# Patient Record
Sex: FEMALE | Race: WHITE | Employment: FULL TIME | URBAN - NONMETROPOLITAN AREA
[De-identification: names, ages, dates, MRNs, and addresses within clinical notes are randomized per-mention and may not be internally consistent; named-entity substitution may affect disease eponyms.]

---

## 2017-01-16 ENCOUNTER — OFFICE VISIT (OUTPATIENT)
Dept: FAMILY MEDICINE CLINIC | Age: 32
End: 2017-01-16

## 2017-01-16 VITALS
WEIGHT: 114 LBS | BODY MASS INDEX: 20.98 KG/M2 | HEIGHT: 62 IN | TEMPERATURE: 97.2 F | OXYGEN SATURATION: 100 % | SYSTOLIC BLOOD PRESSURE: 112 MMHG | DIASTOLIC BLOOD PRESSURE: 65 MMHG | HEART RATE: 62 BPM | RESPIRATION RATE: 12 BRPM

## 2017-01-16 DIAGNOSIS — Z00.00 ROUTINE HEALTH MAINTENANCE: Primary | ICD-10-CM

## 2017-01-16 PROCEDURE — 99395 PREV VISIT EST AGE 18-39: CPT | Performed by: NURSE PRACTITIONER

## 2017-01-16 ASSESSMENT — ENCOUNTER SYMPTOMS
DIARRHEA: 0
VOMITING: 0
RESPIRATORY NEGATIVE: 1
EYES NEGATIVE: 1
ALLERGIC/IMMUNOLOGIC NEGATIVE: 1
SINUS PRESSURE: 0
NAUSEA: 0
GASTROINTESTINAL NEGATIVE: 1
TROUBLE SWALLOWING: 0
ABDOMINAL PAIN: 0
CHEST TIGHTNESS: 0
COUGH: 0
SHORTNESS OF BREATH: 0
CONSTIPATION: 0

## 2017-01-16 ASSESSMENT — PATIENT HEALTH QUESTIONNAIRE - PHQ9
SUM OF ALL RESPONSES TO PHQ QUESTIONS 1-9: 0
SUM OF ALL RESPONSES TO PHQ9 QUESTIONS 1 & 2: 0
2. FEELING DOWN, DEPRESSED OR HOPELESS: 0
1. LITTLE INTEREST OR PLEASURE IN DOING THINGS: 0

## 2017-02-20 ENCOUNTER — TELEPHONE (OUTPATIENT)
Dept: FAMILY MEDICINE CLINIC | Age: 32
End: 2017-02-20

## 2017-03-14 ENCOUNTER — TELEPHONE (OUTPATIENT)
Dept: FAMILY MEDICINE CLINIC | Age: 32
End: 2017-03-14

## 2017-03-24 ENCOUNTER — OFFICE VISIT (OUTPATIENT)
Dept: PRIMARY CARE CLINIC | Age: 32
End: 2017-03-24
Payer: COMMERCIAL

## 2017-03-24 VITALS
BODY MASS INDEX: 21.18 KG/M2 | RESPIRATION RATE: 16 BRPM | DIASTOLIC BLOOD PRESSURE: 76 MMHG | OXYGEN SATURATION: 100 % | HEART RATE: 74 BPM | HEIGHT: 61 IN | TEMPERATURE: 99.1 F | WEIGHT: 112.2 LBS | SYSTOLIC BLOOD PRESSURE: 110 MMHG

## 2017-03-24 DIAGNOSIS — J01.00 ACUTE NON-RECURRENT MAXILLARY SINUSITIS: Primary | ICD-10-CM

## 2017-03-24 PROCEDURE — 99213 OFFICE O/P EST LOW 20 MIN: CPT | Performed by: NURSE PRACTITIONER

## 2017-03-24 RX ORDER — FLUCONAZOLE 150 MG/1
TABLET ORAL
Qty: 2 TABLET | Refills: 0 | Status: SHIPPED | OUTPATIENT
Start: 2017-03-24 | End: 2017-09-17 | Stop reason: ALTCHOICE

## 2017-03-24 RX ORDER — AZITHROMYCIN 250 MG/1
TABLET, FILM COATED ORAL
Qty: 1 PACKET | Refills: 0 | Status: SHIPPED | OUTPATIENT
Start: 2017-03-24 | End: 2017-03-29

## 2017-03-24 RX ORDER — PREDNISONE 20 MG/1
20 TABLET ORAL 2 TIMES DAILY
Qty: 10 TABLET | Refills: 0 | Status: SHIPPED | OUTPATIENT
Start: 2017-03-24 | End: 2017-03-29

## 2017-03-24 ASSESSMENT — ENCOUNTER SYMPTOMS
COUGH: 1
SHORTNESS OF BREATH: 0
SINUS PRESSURE: 1
RHINORRHEA: 1
WHEEZING: 0
SORE THROAT: 1
SINUS COMPLAINT: 1

## 2017-04-27 ENCOUNTER — TELEPHONE (OUTPATIENT)
Dept: FAMILY MEDICINE CLINIC | Age: 32
End: 2017-04-27

## 2017-06-12 ENCOUNTER — OFFICE VISIT (OUTPATIENT)
Dept: PRIMARY CARE CLINIC | Age: 32
End: 2017-06-12
Payer: COMMERCIAL

## 2017-06-12 VITALS
RESPIRATION RATE: 16 BRPM | HEART RATE: 72 BPM | DIASTOLIC BLOOD PRESSURE: 70 MMHG | SYSTOLIC BLOOD PRESSURE: 100 MMHG | TEMPERATURE: 98.2 F | WEIGHT: 115 LBS | HEIGHT: 62 IN | OXYGEN SATURATION: 100 % | BODY MASS INDEX: 21.16 KG/M2

## 2017-06-12 DIAGNOSIS — L23.7 ALLERGIC DERMATITIS DUE TO POISON IVY: Primary | ICD-10-CM

## 2017-06-12 PROCEDURE — 99213 OFFICE O/P EST LOW 20 MIN: CPT | Performed by: NURSE PRACTITIONER

## 2017-06-12 RX ORDER — TRIAMCINOLONE ACETONIDE 1 MG/G
CREAM TOPICAL 3 TIMES DAILY
Qty: 60 G | Refills: 1 | Status: SHIPPED | OUTPATIENT
Start: 2017-06-12 | End: 2017-06-22

## 2017-06-12 RX ORDER — PREDNISONE 10 MG/1
TABLET ORAL
Qty: 37 TABLET | Refills: 0 | Status: SHIPPED | OUTPATIENT
Start: 2017-06-12 | End: 2017-09-17 | Stop reason: ALTCHOICE

## 2017-06-12 ASSESSMENT — ENCOUNTER SYMPTOMS: RESPIRATORY NEGATIVE: 1

## 2017-09-17 ENCOUNTER — OFFICE VISIT (OUTPATIENT)
Dept: PRIMARY CARE CLINIC | Age: 32
End: 2017-09-17
Payer: COMMERCIAL

## 2017-09-17 VITALS
SYSTOLIC BLOOD PRESSURE: 110 MMHG | TEMPERATURE: 98.4 F | DIASTOLIC BLOOD PRESSURE: 64 MMHG | BODY MASS INDEX: 21.9 KG/M2 | WEIGHT: 116 LBS | HEIGHT: 61 IN | HEART RATE: 72 BPM | OXYGEN SATURATION: 100 %

## 2017-09-17 DIAGNOSIS — H66.001 ACUTE SUPPURATIVE OTITIS MEDIA OF RIGHT EAR WITHOUT SPONTANEOUS RUPTURE OF TYMPANIC MEMBRANE, RECURRENCE NOT SPECIFIED: Primary | ICD-10-CM

## 2017-09-17 PROBLEM — J45.990 EXERCISE-INDUCED ASTHMA: Status: ACTIVE | Noted: 2017-09-17

## 2017-09-17 PROCEDURE — 99213 OFFICE O/P EST LOW 20 MIN: CPT | Performed by: FAMILY MEDICINE

## 2017-09-17 RX ORDER — AZITHROMYCIN 250 MG/1
TABLET, FILM COATED ORAL
Qty: 1 PACKET | Refills: 0 | Status: SHIPPED | OUTPATIENT
Start: 2017-09-17 | End: 2019-03-26 | Stop reason: SDUPTHER

## 2018-05-16 ENCOUNTER — OFFICE VISIT (OUTPATIENT)
Dept: FAMILY MEDICINE CLINIC | Age: 33
End: 2018-05-16
Payer: COMMERCIAL

## 2018-05-16 VITALS
RESPIRATION RATE: 12 BRPM | OXYGEN SATURATION: 99 % | TEMPERATURE: 98.7 F | HEART RATE: 65 BPM | SYSTOLIC BLOOD PRESSURE: 100 MMHG | BODY MASS INDEX: 21.3 KG/M2 | HEIGHT: 61 IN | WEIGHT: 112.8 LBS | DIASTOLIC BLOOD PRESSURE: 60 MMHG

## 2018-05-16 DIAGNOSIS — Z00.00 ROUTINE HEALTH MAINTENANCE: Primary | ICD-10-CM

## 2018-05-16 DIAGNOSIS — Z91.09 ENVIRONMENTAL ALLERGIES: ICD-10-CM

## 2018-05-16 DIAGNOSIS — Z23 NEED FOR TDAP VACCINATION: ICD-10-CM

## 2018-05-16 PROCEDURE — 99395 PREV VISIT EST AGE 18-39: CPT | Performed by: NURSE PRACTITIONER

## 2018-05-16 RX ORDER — LORATADINE 10 MG/1
10 TABLET ORAL DAILY
COMMUNITY

## 2018-05-16 RX ORDER — MOMETASONE FUROATE 50 UG/1
2 SPRAY, METERED NASAL 2 TIMES DAILY PRN
COMMUNITY

## 2018-05-16 ASSESSMENT — ENCOUNTER SYMPTOMS
SINUS PRESSURE: 0
CONSTIPATION: 0
NAUSEA: 0
COUGH: 0
SHORTNESS OF BREATH: 0
EYES NEGATIVE: 1
DIARRHEA: 0
ABDOMINAL PAIN: 0
CHEST TIGHTNESS: 0
VOMITING: 0
ALLERGIC/IMMUNOLOGIC NEGATIVE: 1
TROUBLE SWALLOWING: 0

## 2018-05-16 ASSESSMENT — PATIENT HEALTH QUESTIONNAIRE - PHQ9
1. LITTLE INTEREST OR PLEASURE IN DOING THINGS: 0
SUM OF ALL RESPONSES TO PHQ9 QUESTIONS 1 & 2: 0
SUM OF ALL RESPONSES TO PHQ QUESTIONS 1-9: 0
2. FEELING DOWN, DEPRESSED OR HOPELESS: 0

## 2018-05-18 ENCOUNTER — NURSE ONLY (OUTPATIENT)
Dept: LAB | Age: 33
End: 2018-05-18
Payer: COMMERCIAL

## 2018-05-18 DIAGNOSIS — Z00.00 ROUTINE HEALTH MAINTENANCE: ICD-10-CM

## 2018-05-18 DIAGNOSIS — Z23 NEED FOR TDAP VACCINATION: ICD-10-CM

## 2018-05-18 PROCEDURE — 90715 TDAP VACCINE 7 YRS/> IM: CPT | Performed by: NURSE PRACTITIONER

## 2018-05-18 PROCEDURE — 90471 IMMUNIZATION ADMIN: CPT | Performed by: NURSE PRACTITIONER

## 2018-05-31 ENCOUNTER — HOSPITAL ENCOUNTER (OUTPATIENT)
Dept: LAB | Age: 33
Setting detail: SPECIMEN
Discharge: HOME | End: 2018-05-31
Payer: COMMERCIAL

## 2018-05-31 DIAGNOSIS — Z00.00 ROUTINE HEALTH MAINTENANCE: ICD-10-CM

## 2018-05-31 LAB
ABSOLUTE EOS #: 0 K/UL (ref 0–0.4)
ABSOLUTE IMMATURE GRANULOCYTE: ABNORMAL K/UL (ref 0–0.3)
ABSOLUTE LYMPH #: 1 K/UL (ref 1–4.8)
ABSOLUTE MONO #: 0.4 K/UL (ref 0.1–1.2)
ALBUMIN SERPL-MCNC: 4.5 G/DL (ref 3.5–5.2)
ALBUMIN/GLOBULIN RATIO: 1.4 (ref 1–2.5)
ALP BLD-CCNC: 57 U/L (ref 35–104)
ALT SERPL-CCNC: 11 U/L (ref 5–33)
ANION GAP SERPL CALCULATED.3IONS-SCNC: 14 MMOL/L (ref 9–17)
AST SERPL-CCNC: 16 U/L
BASOPHILS # BLD: 0 % (ref 0–1)
BASOPHILS ABSOLUTE: 0 K/UL (ref 0–0.2)
BILIRUB SERPL-MCNC: 0.39 MG/DL (ref 0.3–1.2)
BUN BLDV-MCNC: 10 MG/DL (ref 6–20)
BUN/CREAT BLD: 20 (ref 9–20)
CALCIUM SERPL-MCNC: 9.5 MG/DL (ref 8.6–10.4)
CHLORIDE BLD-SCNC: 100 MMOL/L (ref 98–107)
CHOLESTEROL/HDL RATIO: 2.2
CHOLESTEROL: 170 MG/DL
CO2: 25 MMOL/L (ref 20–31)
CREAT SERPL-MCNC: 0.51 MG/DL (ref 0.5–0.9)
DIFFERENTIAL TYPE: ABNORMAL
EOSINOPHILS RELATIVE PERCENT: 0 % (ref 1–7)
ESTIMATED AVERAGE GLUCOSE: 105 MG/DL
GFR AFRICAN AMERICAN: >60 ML/MIN
GFR NON-AFRICAN AMERICAN: >60 ML/MIN
GFR SERPL CREATININE-BSD FRML MDRD: ABNORMAL ML/MIN/{1.73_M2}
GFR SERPL CREATININE-BSD FRML MDRD: ABNORMAL ML/MIN/{1.73_M2}
GLUCOSE BLD-MCNC: 113 MG/DL (ref 70–99)
HBA1C MFR BLD: 5.3 % (ref 4.8–5.9)
HCT VFR BLD CALC: 41.1 % (ref 36–46)
HDLC SERPL-MCNC: 78 MG/DL
HEMOGLOBIN: 13.5 G/DL (ref 12–16)
HIV AG/AB: NONREACTIVE
IMMATURE GRANULOCYTES: ABNORMAL %
LDL CHOLESTEROL: 83 MG/DL (ref 0–130)
LYMPHOCYTES # BLD: 17 % (ref 16–46)
MCH RBC QN AUTO: 29.9 PG (ref 26–34)
MCHC RBC AUTO-ENTMCNC: 32.8 G/DL (ref 31–37)
MCV RBC AUTO: 91.1 FL (ref 80–100)
MONOCYTES # BLD: 6 % (ref 4–11)
NRBC AUTOMATED: ABNORMAL PER 100 WBC
PDW BLD-RTO: 13 % (ref 11–14.5)
PLATELET # BLD: 237 K/UL (ref 140–450)
PLATELET ESTIMATE: ABNORMAL
PMV BLD AUTO: 9.3 FL (ref 6–12)
POTASSIUM SERPL-SCNC: 4.3 MMOL/L (ref 3.7–5.3)
RBC # BLD: 4.52 M/UL (ref 4–5.2)
RBC # BLD: ABNORMAL 10*6/UL
SEG NEUTROPHILS: 77 % (ref 43–77)
SEGMENTED NEUTROPHILS ABSOLUTE COUNT: 4.5 K/UL (ref 1.8–7.7)
SODIUM BLD-SCNC: 139 MMOL/L (ref 135–144)
THYROXINE, FREE: 1.18 NG/DL (ref 0.93–1.7)
TOTAL PROTEIN: 7.8 G/DL (ref 6.4–8.3)
TRIGL SERPL-MCNC: 46 MG/DL
TSH SERPL DL<=0.05 MIU/L-ACNC: 0.67 MIU/L (ref 0.3–5)
VITAMIN D 25-HYDROXY: 24.1 NG/ML (ref 30–100)
VLDLC SERPL CALC-MCNC: NORMAL MG/DL (ref 1–30)
WBC # BLD: 5.9 K/UL (ref 3.5–11)
WBC # BLD: ABNORMAL 10*3/UL

## 2018-05-31 PROCEDURE — 84443 ASSAY THYROID STIM HORMONE: CPT

## 2018-05-31 PROCEDURE — 84439 ASSAY OF FREE THYROXINE: CPT

## 2018-05-31 PROCEDURE — 82306 VITAMIN D 25 HYDROXY: CPT

## 2018-05-31 PROCEDURE — 36415 COLL VENOUS BLD VENIPUNCTURE: CPT

## 2018-05-31 PROCEDURE — 83036 HEMOGLOBIN GLYCOSYLATED A1C: CPT

## 2018-05-31 PROCEDURE — 80061 LIPID PANEL: CPT

## 2018-05-31 PROCEDURE — 87389 HIV-1 AG W/HIV-1&-2 AB AG IA: CPT

## 2018-05-31 PROCEDURE — 85025 COMPLETE CBC W/AUTO DIFF WBC: CPT

## 2018-05-31 PROCEDURE — 80053 COMPREHEN METABOLIC PANEL: CPT

## 2018-08-06 ENCOUNTER — NURSE ONLY (OUTPATIENT)
Dept: LAB | Age: 33
End: 2018-08-06
Payer: COMMERCIAL

## 2018-08-06 DIAGNOSIS — Z23 NEED FOR VACCINATION: Primary | ICD-10-CM

## 2018-08-06 PROCEDURE — 90632 HEPA VACCINE ADULT IM: CPT | Performed by: NURSE PRACTITIONER

## 2018-08-06 PROCEDURE — 90471 IMMUNIZATION ADMIN: CPT | Performed by: NURSE PRACTITIONER

## 2019-03-08 ENCOUNTER — OFFICE VISIT (OUTPATIENT)
Dept: PRIMARY CARE CLINIC | Age: 34
End: 2019-03-08
Payer: COMMERCIAL

## 2019-03-08 VITALS
TEMPERATURE: 99.3 F | HEIGHT: 61 IN | WEIGHT: 116.8 LBS | DIASTOLIC BLOOD PRESSURE: 64 MMHG | OXYGEN SATURATION: 99 % | HEART RATE: 92 BPM | BODY MASS INDEX: 22.05 KG/M2 | RESPIRATION RATE: 12 BRPM | SYSTOLIC BLOOD PRESSURE: 100 MMHG

## 2019-03-08 DIAGNOSIS — R50.9 FEVER, UNSPECIFIED FEVER CAUSE: ICD-10-CM

## 2019-03-08 DIAGNOSIS — J02.9 SORE THROAT: ICD-10-CM

## 2019-03-08 DIAGNOSIS — B97.89 VIRAL SINUSITIS: Primary | ICD-10-CM

## 2019-03-08 DIAGNOSIS — J32.9 VIRAL SINUSITIS: Primary | ICD-10-CM

## 2019-03-08 LAB
INFLUENZA A ANTIBODY: NORMAL
INFLUENZA B ANTIBODY: NORMAL
S PYO AG THROAT QL: NORMAL

## 2019-03-08 PROCEDURE — 99213 OFFICE O/P EST LOW 20 MIN: CPT | Performed by: NURSE PRACTITIONER

## 2019-03-08 PROCEDURE — 87880 STREP A ASSAY W/OPTIC: CPT | Performed by: NURSE PRACTITIONER

## 2019-03-08 PROCEDURE — 87804 INFLUENZA ASSAY W/OPTIC: CPT | Performed by: NURSE PRACTITIONER

## 2019-03-08 RX ORDER — PREDNISONE 20 MG/1
20 TABLET ORAL 2 TIMES DAILY
Qty: 10 TABLET | Refills: 0 | Status: SHIPPED | OUTPATIENT
Start: 2019-03-08 | End: 2019-03-13

## 2019-03-08 SDOH — HEALTH STABILITY: MENTAL HEALTH: HOW OFTEN DO YOU HAVE A DRINK CONTAINING ALCOHOL?: MONTHLY OR LESS

## 2019-03-08 SDOH — HEALTH STABILITY: MENTAL HEALTH: HOW MANY STANDARD DRINKS CONTAINING ALCOHOL DO YOU HAVE ON A TYPICAL DAY?: 1 OR 2

## 2019-03-08 ASSESSMENT — PATIENT HEALTH QUESTIONNAIRE - PHQ9
2. FEELING DOWN, DEPRESSED OR HOPELESS: 0
SUM OF ALL RESPONSES TO PHQ9 QUESTIONS 1 & 2: 0
SUM OF ALL RESPONSES TO PHQ QUESTIONS 1-9: 0
1. LITTLE INTEREST OR PLEASURE IN DOING THINGS: 0
SUM OF ALL RESPONSES TO PHQ QUESTIONS 1-9: 0

## 2019-03-08 ASSESSMENT — ENCOUNTER SYMPTOMS
SINUS PRESSURE: 1
COUGH: 0
GASTROINTESTINAL NEGATIVE: 1
RESPIRATORY NEGATIVE: 1
SORE THROAT: 1

## 2019-03-19 ENCOUNTER — TELEPHONE (OUTPATIENT)
Dept: FAMILY MEDICINE CLINIC | Age: 34
End: 2019-03-19

## 2019-03-19 ENCOUNTER — E-VISIT (OUTPATIENT)
Dept: PRIMARY CARE CLINIC | Age: 34
End: 2019-03-19
Payer: COMMERCIAL

## 2019-03-19 DIAGNOSIS — J01.90 ACUTE BACTERIAL SINUSITIS: Primary | ICD-10-CM

## 2019-03-19 DIAGNOSIS — B96.89 ACUTE BACTERIAL SINUSITIS: Primary | ICD-10-CM

## 2019-03-19 PROCEDURE — 98968 PH1 ASSMT&MGMT NQHP 21-30: CPT | Performed by: NURSE PRACTITIONER

## 2019-03-19 RX ORDER — AMOXICILLIN AND CLAVULANATE POTASSIUM 875; 125 MG/1; MG/1
1 TABLET, FILM COATED ORAL 2 TIMES DAILY
Qty: 20 TABLET | Refills: 0 | Status: SHIPPED | OUTPATIENT
Start: 2019-03-19 | End: 2019-03-26

## 2019-03-19 ASSESSMENT — LIFESTYLE VARIABLES: SMOKING_STATUS: NO, I'VE NEVER SMOKED

## 2019-03-20 ENCOUNTER — TELEPHONE (OUTPATIENT)
Dept: FAMILY MEDICINE CLINIC | Age: 34
End: 2019-03-20

## 2019-03-20 DIAGNOSIS — T36.95XA ANTIBIOTIC-INDUCED YEAST INFECTION: Primary | ICD-10-CM

## 2019-03-20 DIAGNOSIS — B37.9 ANTIBIOTIC-INDUCED YEAST INFECTION: Primary | ICD-10-CM

## 2019-03-21 RX ORDER — FLUCONAZOLE 150 MG/1
TABLET ORAL
Qty: 2 TABLET | Refills: 0 | Status: SHIPPED | OUTPATIENT
Start: 2019-03-21 | End: 2019-04-26 | Stop reason: ALTCHOICE

## 2019-03-21 RX ORDER — FLUCONAZOLE 150 MG/1
150 TABLET ORAL
Qty: 2 TABLET | Refills: 0 | Status: CANCELLED | OUTPATIENT
Start: 2019-03-21 | End: 2019-03-25

## 2019-03-26 ENCOUNTER — TELEPHONE (OUTPATIENT)
Dept: FAMILY MEDICINE CLINIC | Age: 34
End: 2019-03-26

## 2019-03-26 DIAGNOSIS — B96.89 ACUTE BACTERIAL SINUSITIS: Primary | ICD-10-CM

## 2019-03-26 DIAGNOSIS — J01.90 ACUTE BACTERIAL SINUSITIS: Primary | ICD-10-CM

## 2019-03-26 RX ORDER — AZITHROMYCIN 250 MG/1
TABLET, FILM COATED ORAL
Qty: 1 PACKET | Refills: 0 | Status: SHIPPED | OUTPATIENT
Start: 2019-03-26 | End: 2019-03-30

## 2019-03-27 DIAGNOSIS — Z20.828 EXPOSURE TO INFLUENZA: Primary | ICD-10-CM

## 2019-03-27 RX ORDER — OSELTAMIVIR PHOSPHATE 75 MG/1
75 CAPSULE ORAL 2 TIMES DAILY
Qty: 10 CAPSULE | Refills: 0 | Status: SHIPPED | OUTPATIENT
Start: 2019-03-27 | End: 2019-04-01

## 2019-04-26 ENCOUNTER — OFFICE VISIT (OUTPATIENT)
Dept: FAMILY MEDICINE CLINIC | Age: 34
End: 2019-04-26
Payer: COMMERCIAL

## 2019-04-26 VITALS
TEMPERATURE: 98.3 F | SYSTOLIC BLOOD PRESSURE: 98 MMHG | HEIGHT: 61 IN | BODY MASS INDEX: 22.24 KG/M2 | HEART RATE: 72 BPM | DIASTOLIC BLOOD PRESSURE: 64 MMHG | RESPIRATION RATE: 12 BRPM | WEIGHT: 117.8 LBS | OXYGEN SATURATION: 99 %

## 2019-04-26 DIAGNOSIS — B35.1 TOENAIL FUNGUS: Primary | ICD-10-CM

## 2019-04-26 PROCEDURE — 99213 OFFICE O/P EST LOW 20 MIN: CPT | Performed by: NURSE PRACTITIONER

## 2019-04-26 ASSESSMENT — PATIENT HEALTH QUESTIONNAIRE - PHQ9
SUM OF ALL RESPONSES TO PHQ QUESTIONS 1-9: 0
1. LITTLE INTEREST OR PLEASURE IN DOING THINGS: 0
SUM OF ALL RESPONSES TO PHQ QUESTIONS 1-9: 0
2. FEELING DOWN, DEPRESSED OR HOPELESS: 0
SUM OF ALL RESPONSES TO PHQ9 QUESTIONS 1 & 2: 0

## 2019-04-26 NOTE — PATIENT INSTRUCTIONS
Patient Education        Toenail Fungus: Care Instructions  Your Care Instructions  A toenail that is infected by a fungus usually turns white or yellow. As the fungus spreads, the nail turns a darker color and gets thicker, and its edges start to turn ragged and crumble. A bad infection can cause toe pain, and the nail may pull away from the toe. Toenails that are exposed to moisture and warmth a lot are more likely to get infected by a fungus. This can happen from wearing sweaty shoes often and from walking barefoot on shower floors. It is hard to treat toenail fungus, and the infection can return after it has cleared up. But medicines can sometimes get rid of toenail fungus for good. If the infection is very bad, or if it causes a lot of pain, you may need to have the nail removed. Follow-up care is a key part of your treatment and safety. Be sure to make and go to all appointments, and call your doctor if you are having problems. It's also a good idea to know your test results and keep a list of the medicines you take. How can you care for yourself at home? · Take your medicines exactly as prescribed. Call your doctor if you have any problems with your medicine. You will get more details on the specific medicines your doctor prescribes. · If your doctor gave you a cream or liquid to put on your toenail, use it exactly as directed. · Wash your feet often, and wash your hands after touching your feet. · Keep your toenails clean and dry. Dry your feet completely after you bathe and before you put on shoes and socks. · Keep your toenails trimmed. · Change socks often. Wear dry socks that absorb moisture. · Do not go barefoot in public places. · Use a spray or powder that fights fungus on your feet and in your shoes. · Do not pick at the skin around your nails. · Do not use nail polish or fake nails on your toenails. When should you call for help?   Call your doctor now or seek immediate medical care if:    · You have signs of infection, such as:  ? Increased pain, swelling, warmth, or redness. ? Red streaks leading from the site. ? Pus draining from the site. ? A fever.     · You have new or increased toe pain.    Watch closely for changes in your health, and be sure to contact your doctor if:    · You do not get better as expected. Where can you learn more? Go to https://OpenDoors.supepiceweb.Virtual Gaming Worlds. org and sign in to your Mesosphere account. Enter D202 in the Lumiata box to learn more about \"Toenail Fungus: Care Instructions. \"     If you do not have an account, please click on the \"Sign Up Now\" link. Current as of: April 17, 2018  Content Version: 11.9  © 7244-3969 [employer]URL[employer], [employer]orporated. Care instructions adapted under license by [facility] ([facility]). If you have questions about a medical condition or this instruction, always ask your healthcare professional. [place] [address] any warranty or liability for your use of this information.

## 2019-04-26 NOTE — PROGRESS NOTES
[facility]    Subjective:     Patient ID: Jordi Sams is a 29 y.o. y.o. female. HPI Patient in for office for bilateral great toe pain. This is a new problem for her. She states she has been dealing with this on and off for the past 5 years. She states that she was wearing some bad shoes years a go and the nail cracked. She has seen podiatrist in the past. She has discolored thick nails now. She states that her toe nail feel ingrown. Past Medical History:   Diagnosis Date    Asthma     Exercise-induced asthma        Past Surgical History:   Procedure Laterality Date     SECTION      MOUTH SURGERY      cyst removal at 7yo    TYMPANOSTOMY TUBE PLACEMENT Right 2001       Family History   Problem Relation Age of Onset    High Blood Pressure Mother     High Blood Pressure Father     Asthma Brother           Allergies   Allergen Reactions    Sulfa Antibiotics Swelling       Current Outpatient Medications   Medication Sig Dispense Refill    IBUPROFEN PO Take 2 tablets by mouth every 6 hours as needed      mometasone (NASONEX) 50 MCG/ACT nasal spray 2 sprays by Nasal route 2 times daily as needed (allergies)      loratadine (CLARITIN) 10 MG tablet Take 10 mg by mouth daily       No current facility-administered medications for this visit. Review of Systems   Constitutional: Negative for activity change and appetite change. Musculoskeletal: Positive for myalgias (bilateral great toe pain). Objective:       BP 98/64 (Site: Right Upper Arm, Position: Sitting, Cuff Size: Medium Adult)   Pulse 72   Temp 98.3 °F (36.8 °C) (Tympanic)   Resp 12   Ht 5' 0.98\" (1.549 m)   Wt 117 lb 12.8 oz (53.4 kg)   LMP 2019 (Within Days)   SpO2 99%   Breastfeeding? No   BMI 22.27 kg/m²     Physical Exam   Constitutional: She is oriented to person, place, and time. Vital signs are normal. She appears well-developed and well-nourished.  She is active and cooperative. HENT:   Head: Normocephalic and atraumatic. Nose: Nose normal.   Eyes: Pupils are equal, round, and reactive to light. EOM are normal.   Neck: Normal range of motion. Cardiovascular: Normal rate and regular rhythm. Pulmonary/Chest: Effort normal and breath sounds normal.   Musculoskeletal: Normal range of motion. Feet:    Left toe-nail bed is thick and very flaky and short and thick. Right toe nail thick and discolored. Neurological: She is alert and oriented to person, place, and time. Skin: Skin is warm and dry. Psychiatric: She has a normal mood and affect. Her behavior is normal.   Nursing note and vitals reviewed. Assessment & Plan:      1. Toenail fungus-new problem  Talked about supportive care at this time. Discussed lamisil and she declines. Answered all of the patient's questions. Agrees with plan of care. Follow up PRN.            SANTO Vargas CNP   4/26/2019 10:34 AM

## 2019-11-08 ENCOUNTER — IMMUNIZATION (OUTPATIENT)
Dept: LAB | Age: 34
End: 2019-11-08
Payer: COMMERCIAL

## 2019-11-08 DIAGNOSIS — Z23 NEED FOR VACCINATION: Primary | ICD-10-CM

## 2019-11-08 PROCEDURE — 90472 IMMUNIZATION ADMIN EACH ADD: CPT | Performed by: NURSE PRACTITIONER

## 2019-11-08 PROCEDURE — 90471 IMMUNIZATION ADMIN: CPT | Performed by: NURSE PRACTITIONER

## 2019-11-08 PROCEDURE — 90632 HEPA VACCINE ADULT IM: CPT | Performed by: NURSE PRACTITIONER

## 2019-11-08 PROCEDURE — 90686 IIV4 VACC NO PRSV 0.5 ML IM: CPT | Performed by: NURSE PRACTITIONER

## 2019-12-13 ENCOUNTER — OFFICE VISIT (OUTPATIENT)
Dept: PRIMARY CARE CLINIC | Age: 34
End: 2019-12-13
Payer: COMMERCIAL

## 2019-12-13 VITALS
HEART RATE: 84 BPM | WEIGHT: 121.3 LBS | SYSTOLIC BLOOD PRESSURE: 112 MMHG | OXYGEN SATURATION: 98 % | TEMPERATURE: 98 F | DIASTOLIC BLOOD PRESSURE: 74 MMHG | BODY MASS INDEX: 22.93 KG/M2

## 2019-12-13 DIAGNOSIS — B96.89 ACUTE BACTERIAL SINUSITIS: Primary | ICD-10-CM

## 2019-12-13 DIAGNOSIS — H66.001 NON-RECURRENT ACUTE SUPPURATIVE OTITIS MEDIA OF RIGHT EAR WITHOUT SPONTANEOUS RUPTURE OF TYMPANIC MEMBRANE: ICD-10-CM

## 2019-12-13 DIAGNOSIS — J01.90 ACUTE BACTERIAL SINUSITIS: Primary | ICD-10-CM

## 2019-12-13 PROCEDURE — 99214 OFFICE O/P EST MOD 30 MIN: CPT | Performed by: FAMILY MEDICINE

## 2019-12-13 RX ORDER — CETIRIZINE HYDROCHLORIDE 10 MG/1
10 TABLET ORAL DAILY
COMMUNITY
End: 2021-10-21

## 2019-12-13 RX ORDER — FLUCONAZOLE 150 MG/1
150 TABLET ORAL ONCE
Qty: 2 TABLET | Refills: 0 | Status: SHIPPED | OUTPATIENT
Start: 2019-12-13 | End: 2019-12-13

## 2019-12-13 RX ORDER — AMOXICILLIN 500 MG/1
500 CAPSULE ORAL 3 TIMES DAILY
Qty: 30 CAPSULE | Refills: 0 | Status: SHIPPED | OUTPATIENT
Start: 2019-12-13 | End: 2019-12-13

## 2019-12-13 RX ORDER — AMOXICILLIN AND CLAVULANATE POTASSIUM 875; 125 MG/1; MG/1
1 TABLET, FILM COATED ORAL 2 TIMES DAILY
Qty: 20 TABLET | Refills: 0 | Status: SHIPPED | OUTPATIENT
Start: 2019-12-13 | End: 2019-12-23

## 2019-12-13 ASSESSMENT — ENCOUNTER SYMPTOMS
WHEEZING: 0
RHINORRHEA: 1
CHOKING: 0
COUGH: 1
SHORTNESS OF BREATH: 0
CHEST TIGHTNESS: 0
DIARRHEA: 0
NAUSEA: 0
SINUS PRESSURE: 1
SORE THROAT: 1
CONSTIPATION: 0
TROUBLE SWALLOWING: 0

## 2020-06-12 ENCOUNTER — VIRTUAL VISIT (OUTPATIENT)
Dept: FAMILY MEDICINE CLINIC | Age: 35
End: 2020-06-12
Payer: COMMERCIAL

## 2020-06-12 PROCEDURE — 99395 PREV VISIT EST AGE 18-39: CPT | Performed by: NURSE PRACTITIONER

## 2020-06-12 ASSESSMENT — ENCOUNTER SYMPTOMS
RESPIRATORY NEGATIVE: 1
GASTROINTESTINAL NEGATIVE: 1

## 2020-06-12 ASSESSMENT — PATIENT HEALTH QUESTIONNAIRE - PHQ9
SUM OF ALL RESPONSES TO PHQ9 QUESTIONS 1 & 2: 0
2. FEELING DOWN, DEPRESSED OR HOPELESS: 0
SUM OF ALL RESPONSES TO PHQ QUESTIONS 1-9: 0
1. LITTLE INTEREST OR PLEASURE IN DOING THINGS: 0
SUM OF ALL RESPONSES TO PHQ QUESTIONS 1-9: 0

## 2020-06-12 NOTE — PROGRESS NOTES
and atraumatic. Nose: Nose normal.   Pulmonary:      Effort: Pulmonary effort is normal.      Comments: No distress noted  Neurological:      Mental Status: She is alert and oriented to person, place, and time. Psychiatric:         Behavior: Behavior normal.         Thought Content: Thought content normal.         Judgment: Judgment normal.           Assessment & Plan:      1. Routine health maintenance      2. Environmental allergies-chronic stable on medication      3. Exercise-induced asthma-chronic stable on medication    Answered all of the patient's questions. Agrees with plan of care. Follow up in one year or sooner if needed      Trish Salazar is a 28 y.o. female being evaluated by a Virtual Visit (video visit) encounter to address concerns as mentioned above. A caregiver was present when appropriate. Due to this being a TeleHealth encounter (During [place] [address] public health emergency), evaluation of the following organ systems was limited: Vitals/Constitutional/EENT/Resp/CV/GI//MS/Neuro/Skin/Heme-Lymph-Imm. Pursuant to the emergency declaration under the [address] [place] [address], [address] [place] [address] authority and the [employer] and [employer] Act, this Virtual Visit was conducted with patient's (and/or legal guardian's) consent, to reduce the patient's risk of exposure to COVID-19 and provide necessary medical care. The patient (and/or legal guardian) has also been advised to contact this office for worsening conditions or problems, and seek emergency medical treatment and/or call 911 if deemed necessary. Patient identification was verified at the start of the visit: Yes    Total time spent for this encounter: Not billed by time    Services were provided through a video synchronous discussion virtually to substitute for in-person clinic visit. Patient and provider were located at their individual homes.     --SANTO Bella -

## 2021-03-08 ENCOUNTER — OFFICE VISIT (OUTPATIENT)
Dept: PRIMARY CARE CLINIC | Age: 36
End: 2021-03-08
Payer: COMMERCIAL

## 2021-03-08 ENCOUNTER — HOSPITAL ENCOUNTER (OUTPATIENT)
Age: 36
Setting detail: SPECIMEN
Discharge: HOME | End: 2021-03-08
Payer: COMMERCIAL

## 2021-03-08 VITALS
HEIGHT: 61 IN | WEIGHT: 121.8 LBS | BODY MASS INDEX: 23 KG/M2 | SYSTOLIC BLOOD PRESSURE: 110 MMHG | RESPIRATION RATE: 17 BRPM | TEMPERATURE: 98.9 F | DIASTOLIC BLOOD PRESSURE: 79 MMHG | HEART RATE: 78 BPM | OXYGEN SATURATION: 100 %

## 2021-03-08 DIAGNOSIS — Z20.818 EXPOSURE TO STREP THROAT: ICD-10-CM

## 2021-03-08 DIAGNOSIS — J02.9 PHARYNGITIS, UNSPECIFIED ETIOLOGY: Primary | ICD-10-CM

## 2021-03-08 DIAGNOSIS — J02.9 PHARYNGITIS, UNSPECIFIED ETIOLOGY: ICD-10-CM

## 2021-03-08 LAB — S PYO AG THROAT QL: NORMAL

## 2021-03-08 PROCEDURE — 87651 STREP A DNA AMP PROBE: CPT

## 2021-03-08 PROCEDURE — 99213 OFFICE O/P EST LOW 20 MIN: CPT | Performed by: NURSE PRACTITIONER

## 2021-03-08 PROCEDURE — 87880 STREP A ASSAY W/OPTIC: CPT | Performed by: NURSE PRACTITIONER

## 2021-03-08 ASSESSMENT — ENCOUNTER SYMPTOMS
GASTROINTESTINAL NEGATIVE: 1
SORE THROAT: 1
COUGH: 0
NAUSEA: 0
RHINORRHEA: 0
RESPIRATORY NEGATIVE: 1
VOMITING: 0
ABDOMINAL PAIN: 0
SINUS PRESSURE: 0
SWOLLEN GLANDS: 1

## 2021-03-08 ASSESSMENT — PATIENT HEALTH QUESTIONNAIRE - PHQ9
2. FEELING DOWN, DEPRESSED OR HOPELESS: 0
1. LITTLE INTEREST OR PLEASURE IN DOING THINGS: 0
SUM OF ALL RESPONSES TO PHQ QUESTIONS 1-9: 0

## 2021-03-08 NOTE — PATIENT INSTRUCTIONS
Patient Education        Sore Throat: Care Instructions  Your Care Instructions     Infection by bacteria or a virus causes most sore throats. Cigarette smoke, dry air, air pollution, allergies, and yelling can also cause a sore throat. Sore throats can be painful and annoying. Fortunately, most sore throats go away on their own. If you have a bacterial infection, your doctor may prescribe antibiotics. Follow-up care is a key part of your treatment and safety. Be sure to make and go to all appointments, and call your doctor if you are having problems. It's also a good idea to know your test results and keep a list of the medicines you take. How can you care for yourself at home? · If your doctor prescribed antibiotics, take them as directed. Do not stop taking them just because you feel better. You need to take the full course of antibiotics. · Gargle with warm salt water once an hour to help reduce swelling and relieve discomfort. Use 1 teaspoon of salt mixed in 1 cup of warm water. · Take an over-the-counter pain medicine, such as acetaminophen (Tylenol), ibuprofen (Advil, Motrin), or naproxen (Aleve). Read and follow all instructions on the label. · Be careful when taking over-the-counter cold or flu medicines and Tylenol at the same time. Many of these medicines have acetaminophen, which is Tylenol. Read the labels to make sure that you are not taking more than the recommended dose. Too much acetaminophen (Tylenol) can be harmful. · Drink plenty of fluids. Fluids may help soothe an irritated throat. Hot fluids, such as tea or soup, may help decrease throat pain. · Use over-the-counter throat lozenges to soothe pain. Regular cough drops or hard candy may also help. These should not be given to young children because of the risk of choking. · Do not smoke or allow others to smoke around you. If you need help quitting, talk to your doctor about stop-smoking programs and medicines.  These can increase your chances of quitting for good. · Use a vaporizer or humidifier to add moisture to your bedroom. Follow the directions for cleaning the machine. When should you call for help? Call your doctor now or seek immediate medical care if:    · You have new or worse trouble swallowing.     · Your sore throat gets much worse on one side. Watch closely for changes in your health, and be sure to contact your doctor if you do not get better as expected. Where can you learn more? Go to https://Taskhero.com.IDMission. org and sign in to your Lucky Ant account. Enter Q279 in the Sense of Skin box to learn more about \"Sore Throat: Care Instructions. \"     If you do not have an account, please click on the \"Sign Up Now\" link. Current as of: April 15, 2020               Content Version: 12.6  © 2360-3257 TapDog, Incorporated. Care instructions adapted under license by [facility] ([facility]). If you have questions about a medical condition or this instruction, always ask your healthcare professional. [place] [address] any warranty or liability for your use of this information.

## 2021-03-08 NOTE — PROGRESS NOTES
[facility]             [address], [address]                        Telephone [phone]             Fax [phone]     Silvia Krishnamurthy  1985  NXE:[id number]   Date of visit:  3/8/2021    Subjective:    Silvia Krishnamurthy is a 39 y.o.  female who presents to [facility] today (3/8/2021) for evaluation of:    Chief Complaint   Patient presents with    Pharyngitis     HA. Family member dx with strep. Sx started 2 days ago. Pharyngitis  This is a new problem. The current episode started yesterday. The problem occurs constantly. The problem has been gradually worsening. Associated symptoms include fatigue, headaches, a sore throat and swollen glands. Pertinent negatives include no abdominal pain, chest pain, chills, congestion, coughing, fever, myalgias, nausea, rash or vomiting. The symptoms are aggravated by swallowing. Treatments tried: ibuprofen. The treatment provided moderate relief. Family member in the home tested positive for strep throat this morning. Patient ate after this family member 2 days ago. She has the following problem list:  Patient Active Problem List   Diagnosis    Exercise-induced asthma    Environmental allergies        Current medications are:  Current Outpatient Medications   Medication Sig Dispense Refill    IBUPROFEN PO Take 2 tablets by mouth every 6 hours as needed      mometasone (NASONEX) 50 MCG/ACT nasal spray 2 sprays by Nasal route 2 times daily as needed (allergies)      loratadine (CLARITIN) 10 MG tablet Take 10 mg by mouth daily      cetirizine (ZYRTEC) 10 MG tablet Take 10 mg by mouth daily       No current facility-administered medications for this visit. She is allergic to sulfa antibiotics. .    She  reports that she has never smoked.  She has never used smokeless tobacco.      Objective:    Vitals:    03/08/21 1817   BP: 110/79 Site: Right Upper Arm   Position: Sitting   Cuff Size: Medium Adult   Pulse: 78   Resp: 17   Temp: 98.9 °F (37.2 °C)   TempSrc: Temporal   SpO2: 100%   Weight: 121 lb 12.8 oz (55.2 kg)   Height: 5' 1\" (1.549 m)     Body mass index is 23.01 kg/m². Review of Systems   Constitutional: Positive for appetite change and fatigue. Negative for chills and fever. HENT: Positive for sore throat. Negative for congestion, postnasal drip, rhinorrhea and sinus pressure. Respiratory: Negative. Negative for cough. Cardiovascular: Negative. Negative for chest pain. Gastrointestinal: Negative. Negative for abdominal pain, nausea and vomiting. Musculoskeletal: Negative for myalgias. Skin: Negative for rash. Neurological: Positive for headaches. Physical Exam  Vitals signs and nursing note reviewed. Constitutional:       Appearance: She is well-developed. HENT:      Head: Normocephalic. Jaw: There is normal jaw occlusion. Right Ear: Tympanic membrane, ear canal and external ear normal.      Left Ear: Tympanic membrane, ear canal and external ear normal.      Nose: Nose normal.      Mouth/Throat:      Lips: Pink. Mouth: Mucous membranes are moist.      Pharynx: Uvula midline. Pharyngeal swelling and posterior oropharyngeal erythema present. Tonsils: 1+ on the right. 1+ on the left. Eyes:      Pupils: Pupils are equal, round, and reactive to light. Neck:      Musculoskeletal: Normal range of motion and neck supple. Cardiovascular:      Rate and Rhythm: Normal rate and regular rhythm. Heart sounds: Normal heart sounds. Pulmonary:      Effort: Pulmonary effort is normal.      Breath sounds: Normal breath sounds and air entry. Lymphadenopathy:      Head:      Right side of head: Tonsillar adenopathy present. Left side of head: Tonsillar adenopathy present. Skin:     General: Skin is warm and dry. Neurological:      General: No focal deficit present.       Mental Status: She is alert and oriented to person, place, and time. Psychiatric:         Behavior: Behavior normal.         Thought Content: Thought content normal.       Assessment and Plan:    Results for POC orders placed in visit on 03/08/21   POCT rapid strep A   Result Value Ref Range    Strep A Ag None Detected None Detected        Diagnosis Orders   1. Pharyngitis, unspecified etiology  POCT rapid strep A    Strep A DNA probe, amplification   2. Exposure to strep throat  Strep A DNA probe, amplification       I recommended alternating tylenol and ibuprofen for pain, increase fluid intake, and eating popsicles and jello for comfort. Warm salt water gargles. Use Chloraseptic spray as needed for sore throat. Follow up with PCP if symptoms not improved or worsen. We will call with strep throat culture result. The use, risks, benefits, and side effects of prescribed or recommended medications were discussed. All questions were answered and the patient/caregiver voiced understanding. No orders of the defined types were placed in this encounter.         Electronically signed by SANTO Christie CNP on 3/8/21 at 6:21 PM EST

## 2021-03-10 LAB
DIRECT EXAM: NORMAL
Lab: NORMAL
SPECIMEN DESCRIPTION: NORMAL

## 2021-10-21 ENCOUNTER — OFFICE VISIT (OUTPATIENT)
Dept: PRIMARY CARE CLINIC | Age: 36
End: 2021-10-21
Payer: COMMERCIAL

## 2021-10-21 ENCOUNTER — HOSPITAL ENCOUNTER (OUTPATIENT)
Age: 36
Setting detail: SPECIMEN
Discharge: HOME | End: 2021-10-21
Payer: COMMERCIAL

## 2021-10-21 VITALS
DIASTOLIC BLOOD PRESSURE: 74 MMHG | SYSTOLIC BLOOD PRESSURE: 110 MMHG | BODY MASS INDEX: 22.48 KG/M2 | WEIGHT: 119 LBS | HEART RATE: 64 BPM | OXYGEN SATURATION: 98 % | TEMPERATURE: 97.2 F

## 2021-10-21 DIAGNOSIS — N34.0 SKENE'S GLAND ABSCESS: ICD-10-CM

## 2021-10-21 DIAGNOSIS — N76.0 ACUTE VAGINITIS: ICD-10-CM

## 2021-10-21 DIAGNOSIS — R30.9 PAINFUL URINATION: ICD-10-CM

## 2021-10-21 DIAGNOSIS — R30.9 PAINFUL URINATION: Primary | ICD-10-CM

## 2021-10-21 LAB
-: NORMAL
AMORPHOUS: NORMAL
BACTERIA: NORMAL
BILIRUBIN URINE: NEGATIVE
CASTS UA: NORMAL /LPF (ref 0–2)
COLOR: ABNORMAL
COMMENT UA: ABNORMAL
CRYSTALS, UA: NORMAL /HPF
EPITHELIAL CELLS UA: NORMAL /HPF (ref 0–5)
GLUCOSE URINE: NEGATIVE
KETONES, URINE: NEGATIVE
LEUKOCYTE ESTERASE, URINE: NEGATIVE
MUCUS: NORMAL
NITRITE, URINE: NEGATIVE
OTHER OBSERVATIONS UA: NORMAL
PH UA: 5.5 (ref 5–6)
PROTEIN UA: NEGATIVE
RBC UA: NORMAL /HPF (ref 0–4)
RENAL EPITHELIAL, UA: NORMAL /HPF
SPECIFIC GRAVITY UA: 1 (ref 1.01–1.02)
TRICHOMONAS: NORMAL
TURBIDITY: ABNORMAL
URINE HGB: NEGATIVE
UROBILINOGEN, URINE: NORMAL
WBC UA: NORMAL /HPF (ref 0–4)
YEAST: NORMAL

## 2021-10-21 PROCEDURE — 87070 CULTURE OTHR SPECIMN AEROBIC: CPT

## 2021-10-21 PROCEDURE — 81001 URINALYSIS AUTO W/SCOPE: CPT

## 2021-10-21 PROCEDURE — 87480 CANDIDA DNA DIR PROBE: CPT

## 2021-10-21 PROCEDURE — 87205 SMEAR GRAM STAIN: CPT

## 2021-10-21 PROCEDURE — 86403 PARTICLE AGGLUT ANTBDY SCRN: CPT

## 2021-10-21 PROCEDURE — 99214 OFFICE O/P EST MOD 30 MIN: CPT | Performed by: FAMILY MEDICINE

## 2021-10-21 PROCEDURE — 87510 GARDNER VAG DNA DIR PROBE: CPT

## 2021-10-21 PROCEDURE — 87660 TRICHOMONAS VAGIN DIR PROBE: CPT

## 2021-10-21 RX ORDER — DOXYCYCLINE HYCLATE 100 MG
100 TABLET ORAL 2 TIMES DAILY
Qty: 20 TABLET | Refills: 0 | Status: SHIPPED | OUTPATIENT
Start: 2021-10-21 | End: 2021-10-31

## 2021-10-21 RX ORDER — FLUCONAZOLE 150 MG/1
TABLET ORAL
Qty: 2 TABLET | Refills: 0 | Status: SHIPPED | OUTPATIENT
Start: 2021-10-21 | End: 2021-10-24

## 2021-10-21 RX ORDER — METRONIDAZOLE 500 MG/1
500 TABLET ORAL 2 TIMES DAILY
Qty: 14 TABLET | Refills: 0 | Status: SHIPPED | OUTPATIENT
Start: 2021-10-21 | End: 2021-10-28

## 2021-10-21 ASSESSMENT — PATIENT HEALTH QUESTIONNAIRE - PHQ9
SUM OF ALL RESPONSES TO PHQ QUESTIONS 1-9: 0
2. FEELING DOWN, DEPRESSED OR HOPELESS: 0
SUM OF ALL RESPONSES TO PHQ QUESTIONS 1-9: 0
1. LITTLE INTEREST OR PLEASURE IN DOING THINGS: 0
SUM OF ALL RESPONSES TO PHQ QUESTIONS 1-9: 0
SUM OF ALL RESPONSES TO PHQ9 QUESTIONS 1 & 2: 0

## 2021-10-21 NOTE — PATIENT INSTRUCTIONS
much acetaminophen (Tylenol) can be harmful. · Wear panty liners or pads if you have discharge from the draining cyst.  · Ask your doctor when it is okay for you to have sex. · If you had a catheter placed in the cyst to help it drain, follow your doctor's instructions for activities until the tube comes out. When should you call for help? Call your doctor now or seek immediate medical care if:    · You have symptoms of a new or worse infection, such as:  ? Increased pain, swelling, warmth, or redness. ? Red streaks leading from the area. ? Increased drainage from the area. ? A fever. Watch closely for changes in your health, and be sure to contact your doctor if:    · The catheter falls out.     · You are not getting better as expected. Where can you learn more? Go to https://Current Communications GrouppeQPID Healtheb.[employer]. org and sign in to your Culture Kitchen account. Enter H276 in the HealthEquity box to learn more about \"Bartholin Gland Cyst: Care Instructions. \"     If you do not have an account, please click on the \"Sign Up Now\" link. Current as of: February 11, 2021               Content Version: 13.0  © 8796-5408 Healthwise, Incorporated. Care instructions adapted under license by [facility] ([facility]). If you have questions about a medical condition or this instruction, always ask your healthcare professional. [address] any warranty or liability for your use of this information.

## 2021-10-21 NOTE — PROGRESS NOTES
[facility]             [address] [place] [address] [place], [address]                        Telephone [phone]             Fax [phone]     Shahid Acuna  1985  MRN:  [id number]  Date of visit:  10/21/2021     Assessment and Plan:    1. Acute vaginitis  The exam was consistent with yeast vaginitis. Diflucan was prescribed:  - fluconazole (DIFLUCAN) 150 MG tablet; Take one po. May repeat in 3 days prn. Dispense: 2 tablet; Refill: 0    A swab was sent for vaginitis DNA probe. She will be contacted when the results are available. 2. [place]'s gland abscess  There is spontaneous drainage. A swab was obtained and was sent for culture. Doxycycline and Flagyl were prescribed:  - doxycycline hyclate (VIBRA-TABS) 100 MG tablet; Take 1 tablet by mouth 2 times daily for 10 days  Dispense: 20 tablet; Refill: 0  - metroNIDAZOLE (FLAGYL) 500 MG tablet; Take 1 tablet by mouth 2 times daily for 7 days  Dispense: 14 tablet; Refill: 0    She will be contacted when the ID and sensitivity results are available. She was advised to follow up if symptoms worsen or do not resolve. Printed information regarding Bartholin Gland Cyst was provided to the patient with the after visit summary. Subjective:    Shahid Acuna is a 39 y.o. female who presents to [facility] today (10/21/2021) for evaluation of:  Dysuria (?yeast or BV)      She states that she has had vaginal discharge for the past few days. She uses an over the counter yeast treatment, and she thought her symptoms were improving. The drainage increased yesterday. She has had itching and increased urinary frequency. She denies dysuria. She has a history of bacterial vaginosis. She has never had a urinary tract infection.        She has the following problem list:  Patient Active Problem List   Diagnosis    Exercise-induced asthma    Environmental allergies Current medications are:  Outpatient Medications Marked as Taking for the 10/21/21 encounter (Office Visit) with Sp Nicholson MD   Medication Sig Dispense Refill    IBUPROFEN PO Take 2 tablets by mouth every 6 hours as needed      mometasone (NASONEX) 50 MCG/ACT nasal spray 2 sprays by Nasal route 2 times daily as needed (allergies)      loratadine (CLARITIN) 10 MG tablet Take 10 mg by mouth daily          She is allergic to sulfa antibiotics. She  reports that she has never smoked. She has never used smokeless tobacco.    Objective:    Vitals:    10/21/21 1721   BP: 110/74   Site: Right Upper Arm   Position: Sitting   Cuff Size: Large Adult   Pulse: 64   Temp: 97.2 °F (36.2 °C)   TempSrc: Tympanic   SpO2: 98%   Weight: 119 lb (54 kg)     Body mass index is 22.48 kg/m². Well-nourished, well-developed female healthy-appearing, alert, cooperative and in no acute distress. Pelvic exam: VULVA: there is a tender mass to the right of the urethra. Palpation of the mass produced a purulent appearing fluid. A swab of the fluid was sent for culture. , VAGINA: vaginal discharge - white and curd-like, CERVIX: normal appearing cervix without discharge or lesions, exam chaperoned by Conner Garcia RN.      Results of the urinalysis done today were reviewed with the patient:  Hospital Outpatient Visit on 10/21/2021   Component Date Value Ref Range Status    Color, UA 10/21/2021 NOT REPORTED  Yellow Final    Turbidity UA 10/21/2021 NOT REPORTED  Clear Final    Glucose, Ur 10/21/2021 NEGATIVE  NEGATIVE Final    Bilirubin Urine 10/21/2021 NEGATIVE  NEGATIVE Final    Ketones, Urine 10/21/2021 NEGATIVE  NEGATIVE Final    Specific Gravity, UA 10/21/2021 1.005* 1.010 - 1.025 Final    Urine Hgb 10/21/2021 NEGATIVE  NEGATIVE Final    pH, UA 10/21/2021 5.5  5.0 - 6.0 Final    Protein, UA 10/21/2021 NEGATIVE  NEGATIVE Final    Urobilinogen, Urine 10/21/2021 Normal  Normal Final    Nitrite, Urine 10/21/2021 NEGATIVE  NEGATIVE Final    Leukocyte Esterase, Urine 10/21/2021 NEGATIVE  NEGATIVE Final    Urinalysis Comments 10/21/2021 NOT REPORTED   Final    - 10/21/2021        Final    WBC, UA 10/21/2021 0 TO 4  0 - 4 /HPF Final    RBC, UA 10/21/2021 None  0 - 4 /HPF Final    Casts UA 10/21/2021 NOT REPORTED  0 - 2 /LPF Final    Crystals, UA 10/21/2021 NOT REPORTED  None /HPF Final    Epithelial Cells UA 10/21/2021 None  0 - 5 /HPF Final    Renal Epithelial, UA 10/21/2021 NOT REPORTED  0 /HPF Final    Bacteria, UA 10/21/2021 None  None Final    Mucus, UA 10/21/2021 NOT REPORTED  None Final    Trichomonas, UA 10/21/2021 NOT REPORTED  None Final    Amorphous, UA 10/21/2021 NOT REPORTED  None Final    Other Observations UA 10/21/2021 NOT REPORTED  NOT REQ.  Final    Yeast, UA 10/21/2021 NOT REPORTED  None Final   .       (Please note that portions of this note were completed with a voice-recognition program. Efforts were made to edit the dictation but occasionally words are mis-transcribed.)

## 2021-10-22 LAB
DIRECT EXAM: NORMAL
Lab: NORMAL
SPECIMEN DESCRIPTION: NORMAL

## 2021-10-25 LAB
CULTURE: ABNORMAL
DIRECT EXAM: ABNORMAL
Lab: ABNORMAL
SPECIMEN DESCRIPTION: ABNORMAL

## 2021-10-27 ENCOUNTER — TELEPHONE (OUTPATIENT)
Dept: FAMILY MEDICINE CLINIC | Age: 36
End: 2021-10-27

## 2021-11-01 ENCOUNTER — TELEPHONE (OUTPATIENT)
Dept: FAMILY MEDICINE CLINIC | Age: 36
End: 2021-11-01

## 2022-10-24 ENCOUNTER — HOSPITAL ENCOUNTER (OUTPATIENT)
Age: 37
Discharge: HOME | End: 2022-10-26

## 2022-10-24 ENCOUNTER — HOSPITAL ENCOUNTER (OUTPATIENT)
Dept: GENERAL RADIOLOGY | Age: 37
Discharge: HOME | End: 2022-10-26
Payer: COMMERCIAL

## 2022-10-24 ENCOUNTER — OFFICE VISIT (OUTPATIENT)
Dept: PRIMARY CARE CLINIC | Age: 37
End: 2022-10-24
Payer: COMMERCIAL

## 2022-10-24 VITALS
OXYGEN SATURATION: 99 % | TEMPERATURE: 98.6 F | HEIGHT: 62 IN | BODY MASS INDEX: 22.41 KG/M2 | SYSTOLIC BLOOD PRESSURE: 110 MMHG | HEART RATE: 81 BPM | DIASTOLIC BLOOD PRESSURE: 74 MMHG | WEIGHT: 121.8 LBS

## 2022-10-24 DIAGNOSIS — M25.471 SWELLING OF ANKLE, RIGHT: ICD-10-CM

## 2022-10-24 DIAGNOSIS — S82.839A AVULSION FRACTURE OF DISTAL END OF FIBULA: Primary | ICD-10-CM

## 2022-10-24 PROCEDURE — 99213 OFFICE O/P EST LOW 20 MIN: CPT | Performed by: STUDENT IN AN ORGANIZED HEALTH CARE EDUCATION/TRAINING PROGRAM

## 2022-10-24 PROCEDURE — 73610 X-RAY EXAM OF ANKLE: CPT

## 2022-10-24 PROCEDURE — L1902 AFO ANKLE GAUNTLET PRE OTS: HCPCS | Performed by: STUDENT IN AN ORGANIZED HEALTH CARE EDUCATION/TRAINING PROGRAM

## 2022-10-24 RX ORDER — CETIRIZINE HYDROCHLORIDE 10 MG/1
10 TABLET ORAL DAILY
COMMUNITY

## 2022-10-24 NOTE — PROGRESS NOTES
[facility]             [address] [facility], [place], [address]                        Telephone [phone]             Fax [phone]       Avni Lazcano  :  1985  Age:  40 y.o. MRN:  [id number]  Date of visit:  10/24/2022     Assessment and Plan:    1. Avulsion fracture of distal end of fibula  Aircast and ace bandage applied. Will refer to orthopedics for follow up. No other concerns at this time. Okay for ibuprofen and ice as needed for swelling.   - Adan Tan MD, Orthopedic Surgery, [place]  - Afo ankle gauntlet pre ots    2. Swelling of ankle, right  - XR ANKLE RIGHT (MIN 3 VIEWS); Future      Subjective:    Avni Lazcano is a 40 y.o. female who presents to [facility] today (10/24/2022) for evaluation of:  Joint Swelling (Right ankle pain and swelling. Onset Saturday. [place] a pop. )    Inverted ankle while gardening. [place] a crack in he ankle. Able to walk on it okay. Pain with dorsiflexion. Denies any other injuries. Chief Complaint   Patient presents with    Joint Swelling     Right ankle pain and swelling. Onset Saturday. Limmie Hernandez a pop. She has the following problem list:  Patient Active Problem List   Diagnosis    Exercise-induced asthma    Environmental allergies        Review of Systems   Constitutional:  Negative for chills, fatigue and fever. HENT:  Negative for ear pain, postnasal drip and trouble swallowing. Eyes:  Negative for pain and visual disturbance. Respiratory:  Negative for cough and shortness of breath. Cardiovascular:  Negative for chest pain and palpitations. Gastrointestinal:  Negative for abdominal pain, blood in stool, constipation, diarrhea, nausea and vomiting. Genitourinary:  Negative for dysuria and urgency. Skin:  Negative for rash and wound. Neurological:  Negative for dizziness and headaches.    Psychiatric/Behavioral:  Negative for dysphoric mood. The patient is not nervous/anxious. Current medications are:  Current Outpatient Medications   Medication Sig Dispense Refill    cetirizine (ZYRTEC) 10 MG tablet Take 10 mg by mouth daily      IBUPROFEN PO Take 2 tablets by mouth every 6 hours as needed (Patient not taking: Reported on 10/24/2022)      mometasone (NASONEX) 50 MCG/ACT nasal spray 2 sprays by Nasal route 2 times daily as needed (allergies) (Patient not taking: Reported on 10/24/2022)      loratadine (CLARITIN) 10 MG tablet Take 10 mg by mouth daily (Patient not taking: Reported on 10/24/2022)       No current facility-administered medications for this visit. She is allergic to sulfa antibiotics. She  reports that she has never smoked. She has never used smokeless tobacco.      Objective:    Vitals:    10/24/22 1856   BP: 110/74   Site: Left Upper Arm   Position: Sitting   Pulse: 81   Temp: 98.6 °F (37 °C)   TempSrc: Tympanic   SpO2: 99%   Weight: 121 lb 12.8 oz (55.2 kg)   Height: 5' 2\" (1.575 m)     Body mass index is 22.28 kg/m². Physical Exam  Vitals and nursing note reviewed. Constitutional:       General: She is not in acute distress. Appearance: She is well-developed. She is not diaphoretic. HENT:      Head: Normocephalic and atraumatic. Right Ear: External ear normal.      Left Ear: External ear normal.      Nose: Nose normal.   Eyes:      General: No scleral icterus. Right eye: No discharge. Left eye: No discharge. Conjunctiva/sclera: Conjunctivae normal.   Cardiovascular:      Rate and Rhythm: Normal rate and regular rhythm. Heart sounds: Normal heart sounds. No murmur heard. Pulmonary:      Effort: Pulmonary effort is normal.      Breath sounds: Normal breath sounds. Musculoskeletal:      Cervical back: Normal range of motion. Right ankle: Swelling present. Tenderness present over the lateral malleolus.       Left ankle: Normal.   Skin:     General: Skin is warm and dry. Findings: No erythema or rash. Neurological:      Mental Status: She is alert and oriented to person, place, and time. Cranial Nerves: No cranial nerve deficit. Psychiatric:         Behavior: Behavior normal.         Thought Content:  Thought content normal.         Judgment: Judgment normal.             (Please note that portions of this note were completed with a voice-recognition program. Efforts were made to edit the dictation but occasionally words are mis-transcribed.)

## 2022-10-25 ASSESSMENT — ENCOUNTER SYMPTOMS
VOMITING: 0
ABDOMINAL PAIN: 0
NAUSEA: 0
EYE PAIN: 0
DIARRHEA: 0
COUGH: 0
SHORTNESS OF BREATH: 0
CONSTIPATION: 0
BLOOD IN STOOL: 0
TROUBLE SWALLOWING: 0

## 2022-10-26 DIAGNOSIS — S82.891A CLOSED FRACTURE OF RIGHT ANKLE, INITIAL ENCOUNTER: Primary | ICD-10-CM

## 2022-10-31 ENCOUNTER — OFFICE VISIT (OUTPATIENT)
Dept: ORTHOPEDIC SURGERY | Age: 37
End: 2022-10-31
Payer: COMMERCIAL

## 2022-10-31 ENCOUNTER — HOSPITAL ENCOUNTER (OUTPATIENT)
Dept: GENERAL RADIOLOGY | Age: 37
Discharge: HOME | End: 2022-11-02
Payer: COMMERCIAL

## 2022-10-31 VITALS
HEART RATE: 94 BPM | BODY MASS INDEX: 22.26 KG/M2 | WEIGHT: 121 LBS | HEIGHT: 62 IN | DIASTOLIC BLOOD PRESSURE: 70 MMHG | SYSTOLIC BLOOD PRESSURE: 100 MMHG | OXYGEN SATURATION: 99 %

## 2022-10-31 DIAGNOSIS — S82.891A CLOSED FRACTURE OF RIGHT ANKLE, INITIAL ENCOUNTER: ICD-10-CM

## 2022-10-31 DIAGNOSIS — S82.891A CLOSED FRACTURE OF RIGHT ANKLE, INITIAL ENCOUNTER: Primary | ICD-10-CM

## 2022-10-31 PROCEDURE — 27786 TREATMENT OF ANKLE FRACTURE: CPT | Performed by: ORTHOPAEDIC SURGERY

## 2022-10-31 PROCEDURE — 99999 PR OFFICE/OUTPT VISIT,PROCEDURE ONLY: CPT | Performed by: ORTHOPAEDIC SURGERY

## 2022-10-31 PROCEDURE — 73610 X-RAY EXAM OF ANKLE: CPT

## 2022-10-31 NOTE — PROGRESS NOTES
Orthopedic Office Note  [address], [address]  [facility] [address]      CHIEF COMPLAINT:    Chief Complaint   Patient presents with    New Patient    Fracture     Right ankle       HISTORY OF PRESENT ILLNESS:      The patient is a 40 y.o. female  who presents today for a right ankle fracture. She reports 9 days ago while working the yard she sustained an inversion ankle injury with the cute onset of pain. Subsequently had x-rays obtained and revealed a distal fibular fracture and was referred here for evaluation. She reports just mild pain. She has been using a Aircast splint. Past Medical History:    Past Medical History:   Diagnosis Date    Asthma     Exercise-induced asthma        Past Surgical History:    Past Surgical History:   Procedure Laterality Date     SECTION      MOUTH SURGERY      cyst removal at 8yo    TYMPANOSTOMY TUBE PLACEMENT Right        Medications Prior to Admission:   Current Outpatient Medications   Medication Sig Dispense Refill    cetirizine (ZYRTEC) 10 MG tablet Take 10 mg by mouth daily      IBUPROFEN PO Take 2 tablets by mouth every 6 hours as needed      mometasone (NASONEX) 50 MCG/ACT nasal spray 2 sprays by Nasal route 2 times daily as needed (allergies)      loratadine (CLARITIN) 10 MG tablet Take 10 mg by mouth daily (Patient not taking: Reported on 10/31/2022)       No current facility-administered medications for this visit.        Allergies:  Sulfa antibiotics    Social History:   Social History     Tobacco Use   Smoking Status Never   Smokeless Tobacco Never     Social History     Substance and Sexual Activity   Alcohol Use Yes    Comment: socially     Social History     Substance and Sexual Activity   Drug Use No       Family History:  Family History   Problem Relation Age of Onset    High Blood Pressure Mother     High

## 2022-11-16 DIAGNOSIS — S82.891A CLOSED FRACTURE OF RIGHT ANKLE, INITIAL ENCOUNTER: Primary | ICD-10-CM

## 2022-11-28 ENCOUNTER — HOSPITAL ENCOUNTER (OUTPATIENT)
Dept: GENERAL RADIOLOGY | Age: 37
Discharge: HOME | End: 2022-11-30
Payer: COMMERCIAL

## 2022-11-28 ENCOUNTER — OFFICE VISIT (OUTPATIENT)
Dept: ORTHOPEDIC SURGERY | Age: 37
End: 2022-11-28
Payer: COMMERCIAL

## 2022-11-28 VITALS
HEART RATE: 70 BPM | HEIGHT: 62 IN | SYSTOLIC BLOOD PRESSURE: 114 MMHG | DIASTOLIC BLOOD PRESSURE: 63 MMHG | BODY MASS INDEX: 22.26 KG/M2 | WEIGHT: 121 LBS

## 2022-11-28 DIAGNOSIS — S82.891D CLOSED FRACTURE OF RIGHT ANKLE, WITH ROUTINE HEALING, SUBSEQUENT ENCOUNTER: Primary | ICD-10-CM

## 2022-11-28 DIAGNOSIS — S82.891A CLOSED FRACTURE OF RIGHT ANKLE, INITIAL ENCOUNTER: ICD-10-CM

## 2022-11-28 PROCEDURE — 73610 X-RAY EXAM OF ANKLE: CPT

## 2022-11-28 PROCEDURE — 99024 POSTOP FOLLOW-UP VISIT: CPT | Performed by: ORTHOPAEDIC SURGERY

## 2022-11-28 NOTE — PROGRESS NOTES
Orthopedic Office Note  [address]  [address] [facility], [address]  [place] [address] [facility] [address] [address]      CHIEF COMPLAINT:    Chief Complaint   Patient presents with    Fracture     Rech ankle fracture       HISTORY OF PRESENT ILLNESS:      The patient is a 40 y.o. female  who presents today for follow-up of her right ankle avulsion fracture doing well. She will continue to have some intermittent discomfort but overall feeling well. Past Medical History:    Past Medical History:   Diagnosis Date    Asthma     Exercise-induced asthma        Past Surgical History:    Past Surgical History:   Procedure Laterality Date     SECTION      MOUTH SURGERY      cyst removal at 8yo    TYMPANOSTOMY TUBE PLACEMENT Right        Medications Prior to Admission:   Current Outpatient Medications   Medication Sig Dispense Refill    cetirizine (ZYRTEC) 10 MG tablet Take 10 mg by mouth daily      IBUPROFEN PO Take 2 tablets by mouth every 6 hours as needed      mometasone (NASONEX) 50 MCG/ACT nasal spray 2 sprays by Nasal route 2 times daily as needed (allergies)      loratadine (CLARITIN) 10 MG tablet Take 10 mg by mouth daily (Patient not taking: No sig reported)       No current facility-administered medications for this visit. Allergies:  Sulfa antibiotics    Social History:   Social History     Tobacco Use   Smoking Status Never   Smokeless Tobacco Never     Social History     Substance and Sexual Activity   Alcohol Use Yes    Comment: socially     Social History     Substance and Sexual Activity   Drug Use No       Family History:  Family History   Problem Relation Age of Onset    High Blood Pressure Mother     High Blood Pressure Father     Asthma Brother          REVIEW OF SYSTEMS:  Please see the ROS form attached to today's encounter. I have reviewed it with the patient during the visit.  All other systems were reviewed and are negative. PHYSICAL EXAM:  Right ankle has mild swelling. She is tender along the distal fibula. No gross instability. Gait is normal.    Radiology:  X-rays show the ankle mortise is intact. Avulsion fracture is visualized. ASSESSMENT/PLAN:  1. Closed fracture of right ankle, with routine healing, subsequent encounter        Patient is doing well. I recommended a gradual progression back to activities as she can tolerate. I have given her prescription for physical therapy for strengthening. She will follow-up here on an as-needed basis. No orders of the defined types were placed in this encounter.        Lennie Charles MD

## 2022-11-30 ENCOUNTER — HOSPITAL ENCOUNTER (OUTPATIENT)
Dept: PHYSICAL THERAPY | Age: 37
Setting detail: THERAPIES SERIES
Discharge: HOME | End: 2022-11-30
Payer: COMMERCIAL

## 2022-11-30 PROCEDURE — 97161 PT EVAL LOW COMPLEX 20 MIN: CPT | Performed by: PHYSICAL THERAPIST

## 2022-11-30 ASSESSMENT — PAIN DESCRIPTION - ORIENTATION: ORIENTATION: RIGHT

## 2022-11-30 ASSESSMENT — PAIN DESCRIPTION - DIRECTION: RADIATING_TOWARDS: LATERAL ANKLE

## 2022-11-30 ASSESSMENT — PAIN DESCRIPTION - LOCATION: LOCATION: ANKLE

## 2022-11-30 ASSESSMENT — PAIN - FUNCTIONAL ASSESSMENT: PAIN_FUNCTIONAL_ASSESSMENT: PREVENTS OR INTERFERES WITH MANY ACTIVE NOT PASSIVE ACTIVITIES

## 2022-11-30 ASSESSMENT — PAIN DESCRIPTION - FREQUENCY: FREQUENCY: INTERMITTENT

## 2022-11-30 ASSESSMENT — PAIN SCALES - GENERAL: PAINLEVEL_OUTOF10: 2

## 2022-11-30 ASSESSMENT — PAIN DESCRIPTION - DESCRIPTORS: DESCRIPTORS: ACHING;TIGHTNESS

## 2022-11-30 ASSESSMENT — PAIN DESCRIPTION - ONSET: ONSET: SUDDEN

## 2022-11-30 NOTE — PROGRESS NOTES
Physical Therapy  Initial Assessment  Date: 2022  Patient Name: José Aguilar  MRN: [id number]  : 1985    Referring Physician: MD Joey Gaffney   PCP: JESSICA Stock     Medical Diagnosis: Closed fracture of right ankle, with routine healing, subsequent encounter [S82.891D] S82.891 R ankle Fx  Treatment Diagnosis: S82 R ankle Fx, fibular avulsion      Insurance: Payor: Casey Gale / Plan: Casey Gale - [place] PPO / Product Type: *No Product type* /   Insurance ID: [id number] - ([facility])      Restrictions:- none       Subjective:   General  Chart Reviewed: Yes  Patient Assessed for Rehabilitation Services: Yes  History obtained from[de-identified] Patient, Chart Review  Family/Caregiver Present: No  Diagnosis:  R ankle Fx  Referring Provider (secondary): [place]  Follows Commands: Within Functional Limits  PT Visit Information  Onset Date: 10/24/22  PT Insurance Information: [facility]  Referring Provider (secondary): [place]  Subjective  Subjective: Twisted ankle in the yard, Fx end of fibula. Was in an aircast. a couple weeks. Still has some weakness in the grass.  Some swelling  Prior diagnostic testing[de-identified] X-ray  Pain Screening  Patient Currently in Pain: Yes  Pain Assessment: 0-10  Pain Level: 2  Best Pain Level: 1  Worst Pain Level: 2  Patient's Stated Pain Goal: 1  Pain Location: Ankle  Pain Orientation: Right  Pain Radiating Towards: Lateral ankle  Pain Descriptors: Aching, Tightness  Pain Frequency: Intermittent  Pain Onset: Sudden  Functional Pain Assessment: Prevents or interferes with many active not passive activities  Aggravating factors: Walking, Standing       Vision/Hearing:  Vision  Vision: Within Functional Limits  Hearing  Hearing: Within functional limits    Orientation:  Orientation  Overall Orientation Status: Within Normal Limits  Follows Commands: Within Functional Limits    Social History:  Social History  Lives With: Spouse  Type of Home: House  Home Layout: Two level    Functional Status:  Functional Status  Prior level of function: Independent  Occupation: Full time employment  Type of Occupation: Teacher art K-5  Receives Help From: Family  ADL Assistance: Independent  Homemaking Assistance: Independent  Ambulation Assistance: Independent  Transfer Assistance: Independent  Active : Yes  Mode of Transportation: Car    Objective:     PROM RLE (degrees)  R Ankle Dorsiflexion (0-20): 14  R Ankle Plantar Flexion (0-45): 50  R Ankle Forefoot Inversion (0-40): 45  R Ankle Forefoot Eversion (0-20): 20    Strength RLE  R Ankle Dorsiflexion: 4+/5  R Ankle Plantar flexion: 4+/5  R Ankle Inversion: 4/5  R Ankle Eversion: 4/5     Additional Measures  Special Tests: Tight with full squat  Other: R 1 leg hop amplitude reduced 60% compared to L        Ambulation  Surface: Level tile  Device: No Device  Assistance: Independent  Quality of Gait: No limp at regular pace  Gait Deviations: None  Stairs/Curb  Stairs?: Yes  Stairs  Stairs Height: 8\"  Device: No Device  Comment: +pain descending     Assessment:    Conditions Requiring Skilled Therapeutic Intervention  Body Structures, Functions, Activity Limitations Requiring Skilled Therapeutic Intervention: Increased pain;Decreased strength  Therapy Prognosis: Good  Treatment Diagnosis: S82.892 R ankle Fx, fibular avulsion  Activity Tolerance  Activity Tolerance: Patient tolerated treatment well  Activity Tolerance: Patient tolerated treatment well         Plan:    Physcial Therapy Plan  Plan weeks: 4  Current Treatment Recommendations: Strengthening, ROM, Manual Therapy - Joint Manipulation, Home exercise program      OutComes Score:  LEFS Total Score: 61 (11/30/22 1647)             Goals:  Short Term Goals  Time Frame for Short Term Goals: 1 week  Short Term Goal 1: Start HEP  Long Term Goals  Time Frame for Long Term Goals : 4 weeks  Long Term Goal 1: R ankle 0-1/10 pain during & after activity  Long Term Goal 2: Descend 1 flight of steps with no pain  Long Term Goal 3: Hop on R foot 10x  Long Term Goal 4: Walk in the grass, stones without sense of instability       Therapy Time:   Individual Concurrent Group Co-treatment   Time In  4:25         Time Out  4:50         Minutes  25                 Carlos Lopez, PT

## 2022-11-30 NOTE — PLAN OF CARE
[facility]    [x] [place]  Phone: [phone]  Fax: [phone]      [] [place]  Phone: [phone]  Fax: [phone]        To:        Patient: Law Rodriguez  : 1985   MRN: [id number]  Evaluation Date: 2022      Diagnosis Information:  Diagnosis: S82.891 R ankle Fx   Treatment Diagnosis: S82.892 R ankle Fx, fibular avulsion     Physical Therapy Certification Form  Dear Suzy Shown  The following patient has been evaluated for physical therapy services and for therapy to continue, Medicare requires monthly physician review of the treatment plan. Please review the attached evaluation and/or summary of the patient's plan of care, and verify that you agree therapy should continue by signing the attached document and sending it back to our office.     Plan of Care/Treatment to date:  [x] Therapeutic Exercise    [] Modalities:  [] Therapeutic Activity     [] Ultrasound  [] Electrical Stimulation  [] Gait Training      [] Cervical Traction [] Lumbar Traction  [] Neuromuscular Re-education    [] Cold/hotpack [] Iontophoresis   [x] Instruction in HEP     Other:  [x] Manual Therapy      []             [] Aquatic Therapy      []                 Goals:  Short Term Goals  Time Frame for Short Term Goals: 1 week  Short Term Goal 1: Start HEP    Long Term Goals  Time Frame for Long Term Goals : 4 weeks  Long Term Goal 1: R ankle 0-1/10 pain during & after activity  Long Term Goal 2: Descend 1 flight of steps with no pain  Long Term Goal 3: Hop on R foot 10x  Long Term Goal 4: Walk in the grass, stones without sense of instability    Frequency/Duration:22 - 22  # Days per week: [] 1 day # Weeks: [] 1 week [] 5 weeks     [x] 2 days   [] 2 weeks [] 6 weeks     [] 3 days   [] 3 weeks [] 7 weeks     [] 4 days   [x] 4 weeks [] 8 weeks    Rehab Potential: [] Excellent [x] Good [] Fair  [] Poor     Electronically signed by:  Carlos Lopez PT      If you have any questions or concerns, please don't hesitate to call.   Thank you for your referral.      Physician Signature:________________________________Date:__________________  By signing above, therapists plan is approved by physician

## 2022-12-05 ENCOUNTER — HOSPITAL ENCOUNTER (OUTPATIENT)
Dept: PHYSICAL THERAPY | Age: 37
Setting detail: THERAPIES SERIES
Discharge: HOME | End: 2022-12-05
Payer: COMMERCIAL

## 2022-12-05 PROCEDURE — 97110 THERAPEUTIC EXERCISES: CPT | Performed by: PHYSICAL THERAPY ASSISTANT

## 2022-12-05 NOTE — PROGRESS NOTES
Physical Therapy    Physical Therapy Daily Treatment Note    Date:  2022    Patient Name:  Amiel Litten    :  1985  MRN: [id number]  Restrictions/Precautions:     Medical/Treatment Diagnosis Information:   Diagnosis: S82.891 R ankle Fx  Treatment Diagnosis: S82.892 R ankle Fx, fibular avulsion  Insurance/Certification information:  PT Insurance Information: BCBS  Physician Information:   [employer] of care signed (Y/N):    Visit# / total visits:    Pain level: 2/10       Time In: 5467   Time Out:636    Progress Note: []  Yes  [x]  No  Next due by: Visit #10 , or 22     Subjective:  Notes difficulty on uneven surfaces and descending steps. Notes minimal pain discomfort. Objective: JT complete per flow chart to facilitate strength, motion and stability to allow ease with daily ambulation and work related activities. Patient given written and verbal instruction for HEP. Understanding noted. No increase in pain noted with exercises this date. Observation:   Test measurements:      Exercises:   Exercise/Equipment Resistance/Repetitions Other comments   Toe/ heel raise 10x On airex   Toe raise on blue beam 10x    Squat matrix 5x 9 position   Lunges 10x Front/ lat / pivot   Step up   Front/ lat retro   Step up & over     Toe/ heel walk 2x         BAPS 10x  L2 (standing)                             [x] Provided verbal/tactile cueing for activities related to strengthening, flexibility, endurance, ROM. (70802)  [] Provided verbal/tactile cueing for activities related to improving balance, coordination, kinesthetic sense, posture, motor skill, proprioception. (81684)    Therapeutic Activities:     [] Therapeutic activities, direct (one-on-one) patient contact (use of dynamic activities to improve functional performance). (88108)    Gait:   [] Provided training and instruction to the patient for ambulation re-education.  (56149)    Self-Care/ADL's  [] Self-care/home management training and compensatory training, meal preparation, safety procedures, and instructions in use of assistive technology devices/adaptive equipment, direct one-on-one contact. (06190)    Home Exercise Program:  Toe raise on 2\" pre-stretch   [x] Reviewed/Progressed HEP activities related to strengthening, flexibility, endurance, ROM. (22781)  [] Reviewed/Progressed HEP activities related to improving balance, coordination, kinesthetic sense, posture, motor skill, proprioception.  (27248)    Manual Treatments:    [] Provided manual therapy to mobilize soft tissue/joints for the purpose of modulating pain, promoting relaxation,  increasing ROM, reducing/eliminating soft tissue swelling/inflammation/restriction, improving soft tissue extensibility. (73720)    Service Based Modalities:      Timed Code Treatment Minutes:   32' TJ    Total Treatment Minutes:   28'    Treatment/Activity Tolerance:  [x] Patient tolerated treatment well [] Patient limited by fatique  [] Patient limited by pain  [] Patient limited by other medical complications  [] Other:     Prognosis: [x] Good [] Fair  [] Poor    Patient Requires Follow-up: [x] Yes  [] No      Goals:  Short Term Goals  Time Frame for Short Term Goals: 1 week  Short Term Goal 1: Start HEP (Initiated this date)    Long Term Goals  Time Frame for Long Term Goals : 4 weeks  Long Term Goal 1: R ankle 0-1/10 pain during & after activity  Long Term Goal 2: Descend 1 flight of steps with no pain  Long Term Goal 3: Hop on R foot 10x  Long Term Goal 4: Walk in the grass, stones without sense of instability    Plan:   [x] Continue per plan of care [] Alter current plan (see comments)  [] Plan of care initiated [] Hold pending MD visit [] Discharge    Plan for Next Session:  Monitor tolerance and advance as able. Electronically signed by:   Mike Wilkerson PTA

## 2022-12-07 ENCOUNTER — HOSPITAL ENCOUNTER (OUTPATIENT)
Dept: PHYSICAL THERAPY | Age: 37
Setting detail: THERAPIES SERIES
Discharge: HOME | End: 2022-12-07
Payer: COMMERCIAL

## 2022-12-07 PROCEDURE — 97110 THERAPEUTIC EXERCISES: CPT

## 2022-12-07 NOTE — PROGRESS NOTES
Physical Therapy    Physical Therapy Daily Treatment Note    Date:  2022    Patient Name:  Michelle Greer    :  1985  MRN: [id number]  Restrictions/Precautions:     Medical/Treatment Diagnosis Information:   Diagnosis: S82.891 R ankle Fx  Treatment Diagnosis: S82.892 R ankle Fx, fibular avulsion  Insurance/Certification information:  PT Insurance Information: BCBS  Physician Information:   [employer] of care signed (Y/N):    Visit# / total visits:  3/12  Pain level: 0/10       Time In:  9:47  Time Out:10:25    Progress Note: []  Yes  [x]  No  Next due by: Visit #10 , or 22     Subjective:  Pt reports ankle is doing well. Pt had some soreness after last session. Objective: TJ complete per flow chart to facilitate strength, motion and stability to allow ease with daily ambulation and work related activities. Instruction on frequency of HEP. Observation:   Test measurements:      Exercises:   Exercise/Equipment Resistance/Repetitions Other comments   Toe/ heel raise 15x On airex   Toe raise on blue beam 15x    Squat matrix 5x 9 position   Lunges 10x Front/ lat / pivot   Step up  10x 4\" Front/ lat retro   Step up & over 10x 4\"    Toe/ heel walk 2x         BAPS 10x  L2 (standing)    Golfers   10x    SLS 30\"x2                   [x] Provided verbal/tactile cueing for activities related to strengthening, flexibility, endurance, ROM. (15523)  [] Provided verbal/tactile cueing for activities related to improving balance, coordination, kinesthetic sense, posture, motor skill, proprioception. (34134)    Therapeutic Activities:     [] Therapeutic activities, direct (one-on-one) patient contact (use of dynamic activities to improve functional performance). (76144)    Gait:   [] Provided training and instruction to the patient for ambulation re-education.  (48914)    Self-Care/ADL's  [] Self-care/home management training and compensatory training, meal preparation, safety procedures, and instructions in use of assistive technology devices/adaptive equipment, direct one-on-one contact. (35107)    Home Exercise Program:  Toe raise on 2\" pre-stretch, 4 way ankle TB,   [x] Reviewed/Progressed HEP activities related to strengthening, flexibility, endurance, ROM. (68239)  [] Reviewed/Progressed HEP activities related to improving balance, coordination, kinesthetic sense, posture, motor skill, proprioception.  (71300)    Manual Treatments:    [] Provided manual therapy to mobilize soft tissue/joints for the purpose of modulating pain, promoting relaxation,  increasing ROM, reducing/eliminating soft tissue swelling/inflammation/restriction, improving soft tissue extensibility. (34508)    Service Based Modalities:      Timed Code Treatment Minutes:   45' TJ    Total Treatment Minutes:   45'    Treatment/Activity Tolerance:  [x] Patient tolerated treatment well [] Patient limited by fatique  [] Patient limited by pain  [] Patient limited by other medical complications  [] Other:     Prognosis: [x] Good [] Fair  [] Poor    Patient Requires Follow-up: [x] Yes  [] No      Goals:  Short Term Goals  Time Frame for Short Term Goals: 1 week  Short Term Goal 1: Start HEP (Initiated this date)    Long Term Goals  Time Frame for Long Term Goals : 4 weeks  Long Term Goal 1: R ankle 0-1/10 pain during & after activity  Long Term Goal 2: Descend 1 flight of steps with no pain  Long Term Goal 3: Hop on R foot 10x  Long Term Goal 4: Walk in the grass, stones without sense of instability    Plan:   [x] Continue per plan of care [] Alter current plan (see comments)  [] Plan of care initiated [] Hold pending MD visit [] Discharge    Plan for Next Session:  Monitor tolerance and advance as able.      Electronically signed by:  Lisa Sousa PTA

## 2022-12-08 NOTE — PROGRESS NOTES
I have reviewed and agree to the content of the note written by the PTA.   Electronically signed by Jignesh Rogers PT [id number]

## 2022-12-12 ENCOUNTER — HOSPITAL ENCOUNTER (OUTPATIENT)
Dept: PHYSICAL THERAPY | Age: 37
Setting detail: THERAPIES SERIES
Discharge: HOME | End: 2022-12-12
Payer: COMMERCIAL

## 2022-12-12 PROCEDURE — 97110 THERAPEUTIC EXERCISES: CPT

## 2022-12-12 NOTE — PROGRESS NOTES
Physical Therapy    Physical Therapy Daily Treatment Note    Date:  2022    Patient Name:  Tram Villa    :  1985  MRN: [id number]  Restrictions/Precautions:     Medical/Treatment Diagnosis Information:   Diagnosis: S82.891 R ankle Fx  Treatment Diagnosis: S82.892 R ankle Fx, fibular avulsion  Insurance/Certification information:  PT Insurance Information: BCBS  Physician Information:   [employer] of care signed (Y/N):    Visit# / total visits:    Pain level: 0/10       Time In:  4:17    Time Out: 4:55    Progress Note: []  Yes  [x]  No  Next due by: Visit #10 , or 22     Subjective:  Pt reports ankle is doing well. Pt reports uneven surfaces are the worst.     Objective: TJ complete per flow chart to facilitate strength, motion and stability to allow ease with daily ambulation and work related activities. Initiated new exercise with good tolerance. Observation:   Test measurements:      Exercises:   Exercise/Equipment Resistance/Repetitions Other comments   Toe/ heel raise 15x On airex   Toe raise on blue beam 15x    Squat matrix 5x  foam 9 position   Lunges 10x Front/ lat / pivot   Step up  10x 4\" Front/ lat retro   Step up & over 10x 4\"    Toe/ heel walk 2x    3 way 10x foam   Resisted walk 10x         BAPS 10x  L2 (standing)    Golfers   10x    SLS 30\"x2    Tandem stance 30\"x2    SLS rebounder  10x 4#   TKE in SLS 10x red    [x] Provided verbal/tactile cueing for activities related to strengthening, flexibility, endurance, ROM. (47927)  [] Provided verbal/tactile cueing for activities related to improving balance, coordination, kinesthetic sense, posture, motor skill, proprioception. (50194)    Therapeutic Activities:     [] Therapeutic activities, direct (one-on-one) patient contact (use of dynamic activities to improve functional performance). (29879)    Gait:   [] Provided training and instruction to the patient for ambulation re-education. (98212)    Self-Care/ADL's  [] Self-care/home management training and compensatory training, meal preparation, safety procedures, and instructions in use of assistive technology devices/adaptive equipment, direct one-on-one contact. (75354)    Home Exercise Program:  Toe raise on 2\" pre-stretch, 4 way ankle TB,   [x] Reviewed/Progressed HEP activities related to strengthening, flexibility, endurance, ROM. (82925)  [] Reviewed/Progressed HEP activities related to improving balance, coordination, kinesthetic sense, posture, motor skill, proprioception.  (67810)    Manual Treatments:    [] Provided manual therapy to mobilize soft tissue/joints for the purpose of modulating pain, promoting relaxation,  increasing ROM, reducing/eliminating soft tissue swelling/inflammation/restriction, improving soft tissue extensibility. (06045)    Service Based Modalities:      Timed Code Treatment Minutes:   45' TJ    Total Treatment Minutes:   45'    Treatment/Activity Tolerance:  [x] Patient tolerated treatment well [] Patient limited by fatique  [] Patient limited by pain  [] Patient limited by other medical complications  [] Other:     Prognosis: [x] Good [] Fair  [] Poor    Patient Requires Follow-up: [x] Yes  [] No      Goals:  Short Term Goals  Time Frame for Short Term Goals: 1 week  Short Term Goal 1: Start HEP (Initiated this date)    Long Term Goals  Time Frame for Long Term Goals : 4 weeks  Long Term Goal 1: R ankle 0-1/10 pain during & after activity  Long Term Goal 2: Descend 1 flight of steps with no pain  Long Term Goal 3: Hop on R foot 10x  Long Term Goal 4: Walk in the grass, stones without sense of instability    Plan:   [x] Continue per plan of care [] Alter current plan (see comments)  [] Plan of care initiated [] Hold pending MD visit [] Discharge    Plan for Next Session:  Monitor tolerance and advance as able.      Electronically signed by:  Lisy Cade PTA

## 2022-12-14 ENCOUNTER — APPOINTMENT (OUTPATIENT)
Dept: PHYSICAL THERAPY | Age: 37
End: 2022-12-14
Payer: COMMERCIAL

## 2022-12-14 ENCOUNTER — HOSPITAL ENCOUNTER (OUTPATIENT)
Dept: PHYSICAL THERAPY | Age: 37
Setting detail: THERAPIES SERIES
Discharge: HOME | End: 2022-12-14
Payer: COMMERCIAL

## 2022-12-14 PROCEDURE — 97110 THERAPEUTIC EXERCISES: CPT | Performed by: PHYSICAL THERAPY ASSISTANT

## 2022-12-14 NOTE — PROGRESS NOTES
I have reviewed and agree to the content of the note written by the PTA.   Electronically signed by Ishmael Gray PT [id number]

## 2022-12-14 NOTE — PROGRESS NOTES
Physical Therapy    Physical Therapy Daily Treatment Note    Date:  2022    Patient Name:  Mikayla Shrestha    :  1985  MRN: [id number]  Restrictions/Precautions:     Medical/Treatment Diagnosis Information:   Diagnosis: S82.891 R ankle Fx  Treatment Diagnosis: S82.892 R ankle Fx, fibular avulsion  Insurance/Certification information:  PT Insurance Information: BCBS  Physician Information:   [employer] of care signed (Y/N): Y    Visit# / total visits:    Pain level: 0/10       Time In:  740   Time Out: 451    Progress Note: []  Yes  [x]  No  Next due by: Visit #10 , or 22     Subjective:  Pt reports ankle is doing well. Some soreness and discomfort noted. Objective: TJ complete per flow chart to facilitate strength, motion and stability to allow ease with daily ambulation and work related activities. Initiated and advanced several exercises to improve motion and mobility. Min instability remains with exercises this date. Verbal cuing for progression and technique with exercises.      Observation:   Test measurements:      Exercises:   Exercise/Equipment Resistance/Repetitions Other comments   Toe/ heel raise 15x On airex   Toe raise on blue beam 15x    Squat matrix 5x  foam 9 position   Lunges 10x (on foam) Front/ lat / pivot   Step up  10x6'' Front/ lat retro   Step up & over 10x6''    Toe/ heel walk 2x    3 way 10x Foam (no UE)   Resisted walk 10x         BAPS 10x  L2 (standing)    Golfers       SLS 30\"x2 FOAM   Tandem stance 30\"x2 FOAM   SLS rebounder  10x 4#   TKE in SLS 10x red    [x] Provided verbal/tactile cueing for activities related to strengthening, flexibility, endurance, ROM. (02347)  [] Provided verbal/tactile cueing for activities related to improving balance, coordination, kinesthetic sense, posture, motor skill, proprioception. (31715)    Therapeutic Activities:     [] Therapeutic activities, direct (one-on-one) patient contact (use of dynamic activities to improve functional performance). (33663)    Gait:   [] Provided training and instruction to the patient for ambulation re-education. (83291)    Self-Care/ADL's  [] Self-care/home management training and compensatory training, meal preparation, safety procedures, and instructions in use of assistive technology devices/adaptive equipment, direct one-on-one contact. (39326)    Home Exercise Program:  Toe raise on 2\" pre-stretch, 4 way ankle TB,   [x] Reviewed/Progressed HEP activities related to strengthening, flexibility, endurance, ROM. (71597)  [] Reviewed/Progressed HEP activities related to improving balance, coordination, kinesthetic sense, posture, motor skill, proprioception.  (72869)    Manual Treatments:    [] Provided manual therapy to mobilize soft tissue/joints for the purpose of modulating pain, promoting relaxation,  increasing ROM, reducing/eliminating soft tissue swelling/inflammation/restriction, improving soft tissue extensibility.  (94878)    Service Based Modalities:      Timed Code Treatment Minutes:   26' TJ    Total Treatment Minutes:   26'    Treatment/Activity Tolerance:  [x] Patient tolerated treatment well [] Patient limited by fatique  [] Patient limited by pain  [] Patient limited by other medical complications  [] Other:     Prognosis: [x] Good [] Fair  [] Poor    Patient Requires Follow-up: [x] Yes  [] No      Goals:  Short Term Goals  Time Frame for Short Term Goals: 1 week  Short Term Goal 1: Start HEP (Initiated this date)    Long Term Goals  Time Frame for Long Term Goals : 4 weeks  Long Term Goal 1: R ankle 0-1/10 pain during & after activity  Long Term Goal 2: Descend 1 flight of steps with no pain  Long Term Goal 3: Hop on R foot 10x  Long Term Goal 4: Walk in the grass, stones without sense of instability    Plan:   [x] Continue per plan of care [] Alter current plan (see comments)  [] Plan of care initiated [] Hold pending MD visit [] Discharge    Plan for Next Session:  Monitor tolerance and advance as able. Electronically signed by:   Hema James PTA

## 2022-12-15 NOTE — PROGRESS NOTES
I have reviewed and agree to the content of the note written by the PTA.   Electronically signed by Ana Farnsworth PT [id number]

## 2022-12-20 ENCOUNTER — HOSPITAL ENCOUNTER (OUTPATIENT)
Dept: PHYSICAL THERAPY | Age: 37
Setting detail: THERAPIES SERIES
Discharge: HOME | End: 2022-12-20
Payer: COMMERCIAL

## 2022-12-20 PROCEDURE — 97110 THERAPEUTIC EXERCISES: CPT

## 2022-12-20 NOTE — PROGRESS NOTES
Physical Therapy    Physical Therapy Daily Treatment Note    Date:  2022    Patient Name:  Kevan Horowitz    :  1985  MRN: [id number]  Restrictions/Precautions:     Medical/Treatment Diagnosis Information:   Diagnosis: S82.891 R ankle Fx  Treatment Diagnosis: S82.892 R ankle Fx, fibular avulsion  Insurance/Certification information:  PT Insurance Information: BCBS  Physician Information:   [employer] of care signed (Y/N): Y    Visit# / total visits:    Pain level: 0/10       Time In:  703   Time Out:736    Progress Note: []  Yes  [x]  No  Next due by: Visit #10 , or 22     Subjective:  Pt reports ankle has instability and weakness only with unlevel surfaces. Objective: TJ complete per flow chart to facilitate strength, motion and stability to allow ease with daily ambulation and work related activities. Advanced several exercises to improve strength and stability. SBA for balance on BOSU. Observation:   Test measurements:      Exercises:   Exercise/Equipment Resistance/Repetitions Other comments   Toe/ heel raise 15x On airex   Toe raise on blue beam 15x    Squat matrix 10x  foam 9 position   Lunges 10x (on foam) Front/ lat / pivot   Step up  10x bosu  Front/ lat    Step   6\" x 10  Back    Step up & over 10x6''    Toe/ heel walk 2x    3 way 15x ea  Foam (no UE)   Resisted walk          BAPS 15x no Ue's  L2 (standing)    Golfers       SLS 30\"x2 FOAM   Tandem stance 30\"x2 FOAM   SLS rebounder  10x 4#   TKE in SLS 15x red    [x] Provided verbal/tactile cueing for activities related to strengthening, flexibility, endurance, ROM. (41421)  [] Provided verbal/tactile cueing for activities related to improving balance, coordination, kinesthetic sense, posture, motor skill, proprioception. (69923)    Therapeutic Activities:     [] Therapeutic activities, direct (one-on-one) patient contact (use of dynamic activities to improve functional performance).  (28744)    Gait:   [] Provided training and instruction to the patient for ambulation re-education. (03967)    Self-Care/ADL's  [] Self-care/home management training and compensatory training, meal preparation, safety procedures, and instructions in use of assistive technology devices/adaptive equipment, direct one-on-one contact. (46257)    Home Exercise Program:  Toe raise on 2\" pre-stretch, 4 way ankle TB,   [x] Reviewed/Progressed HEP activities related to strengthening, flexibility, endurance, ROM. (15521)  [] Reviewed/Progressed HEP activities related to improving balance, coordination, kinesthetic sense, posture, motor skill, proprioception.  (28345)    Manual Treatments:    [] Provided manual therapy to mobilize soft tissue/joints for the purpose of modulating pain, promoting relaxation,  increasing ROM, reducing/eliminating soft tissue swelling/inflammation/restriction, improving soft tissue extensibility. (39776)    Service Based Modalities:      Timed Code Treatment Minutes:   35' TJ    Total Treatment Minutes:   35'    Treatment/Activity Tolerance:  [x] Patient tolerated treatment well [] Patient limited by fatique  [] Patient limited by pain  [] Patient limited by other medical complications  [] Other:     Prognosis: [x] Good [] Fair  [] Poor    Patient Requires Follow-up: [x] Yes  [] No      Goals:  Short Term Goals  Time Frame for Short Term Goals: 1 week  Short Term Goal 1: Start HEP (Initiated this date)    Long Term Goals  Time Frame for Long Term Goals : 4 weeks  Long Term Goal 1: R ankle 0-1/10 pain during & after activity  Long Term Goal 2: Descend 1 flight of steps with no pain  Long Term Goal 3: Hop on R foot 10x  Long Term Goal 4: Walk in the grass, stones without sense of instability    Plan:   [x] Continue per plan of care [] Alter current plan (see comments)  [] Plan of care initiated [] Hold pending MD visit [] Discharge    Plan for Next Session:  Monitor tolerance and advance as able.      Electronically signed by: Serge Armstrong, PT

## 2022-12-22 ENCOUNTER — HOSPITAL ENCOUNTER (OUTPATIENT)
Dept: PHYSICAL THERAPY | Age: 37
Setting detail: THERAPIES SERIES
Discharge: HOME | End: 2022-12-22
Payer: COMMERCIAL

## 2022-12-22 PROCEDURE — 97110 THERAPEUTIC EXERCISES: CPT

## 2022-12-22 NOTE — PROGRESS NOTES
Physical Therapy    Physical Therapy Daily Treatment Note    Date:  2022    Patient Name:  Amiel Litten    :  1985  MRN: [id number]  Restrictions/Precautions:     Medical/Treatment Diagnosis Information:   Diagnosis: S82.891 R ankle Fx  Treatment Diagnosis: S82.892 R ankle Fx, fibular avulsion  Insurance/Certification information:  PT Insurance Information: BCBS  Physician Information:   [employer] of care signed (Y/N): Y    Visit# / total visits:    Pain level: 0/10       Time In:  702   Time Out:732    Progress Note: []  Yes  [x]  No  Next due by: Visit #10 , or 22     Subjective:  Pt reports ankle discomfort after last session. Patient noting continues to have the discomfort today. .     Objective: TJ complete per flow chart to facilitate strength, motion and stability to allow ease with daily ambulation and work related activities. Held all foam this date with exs, plan to readd as able if pain decreases. Observation:   Test measurements:      Exercises:   Exercise/Equipment Resistance/Repetitions Other comments   Toe/ heel raise 15x On airex   Toe raise on blue beam 15x    Squat matrix 10x   9 position   Lunges 10x  Front/ lat / pivot   Step up  10x  Front/ lat    Step   6\" x 10  Back    Step up & over 10x6''    Toe/ heel walk 2x    3 way 15x ea  (no UE)   Resisted walk          BAPS 15x no Ue's  L2 (standing)    Golfers       SLS FOAM   Tandem stance FOAM   SLS rebounder  4#   TKE in SLS 15x red    [x] Provided verbal/tactile cueing for activities related to strengthening, flexibility, endurance, ROM. (87863)  [] Provided verbal/tactile cueing for activities related to improving balance, coordination, kinesthetic sense, posture, motor skill, proprioception. (80961)    Therapeutic Activities:     [] Therapeutic activities, direct (one-on-one) patient contact (use of dynamic activities to improve functional performance).  (35165)    Gait:   [] Provided training and instruction to the patient for ambulation re-education. (21628)    Self-Care/ADL's  [] Self-care/home management training and compensatory training, meal preparation, safety procedures, and instructions in use of assistive technology devices/adaptive equipment, direct one-on-one contact. (36607)    Home Exercise Program:  Toe raise on 2\" pre-stretch, 4 way ankle TB,   [x] Reviewed/Progressed HEP activities related to strengthening, flexibility, endurance, ROM. (29649)  [] Reviewed/Progressed HEP activities related to improving balance, coordination, kinesthetic sense, posture, motor skill, proprioception.  (58413)    Manual Treatments:    [] Provided manual therapy to mobilize soft tissue/joints for the purpose of modulating pain, promoting relaxation,  increasing ROM, reducing/eliminating soft tissue swelling/inflammation/restriction, improving soft tissue extensibility. (09460)    Service Based Modalities:      Timed Code Treatment Minutes:   30' TJ    Total Treatment Minutes:   30'    Treatment/Activity Tolerance:  [x] Patient tolerated treatment well [] Patient limited by fatique  [] Patient limited by pain  [] Patient limited by other medical complications  [] Other:     Prognosis: [x] Good [] Fair  [] Poor    Patient Requires Follow-up: [x] Yes  [] No      Goals:  Short Term Goals  Time Frame for Short Term Goals: 1 week  Short Term Goal 1: Start HEP (Initiated this date)    Long Term Goals  Time Frame for Long Term Goals : 4 weeks  Long Term Goal 1: R ankle 0-1/10 pain during & after activity  Long Term Goal 2: Descend 1 flight of steps with no pain  Long Term Goal 3: Hop on R foot 10x  Long Term Goal 4: Walk in the grass, stones without sense of instability    Plan:   [x] Continue per plan of care [] Alter current plan (see comments)  [] Plan of care initiated [] Hold pending MD visit [] Discharge    Plan for Next Session:  Monitor tolerance and advance as able.      Electronically signed by:  Maria Elena Guerrero Harley Serrano, PT

## 2022-12-27 ENCOUNTER — HOSPITAL ENCOUNTER (OUTPATIENT)
Dept: PHYSICAL THERAPY | Age: 37
Setting detail: THERAPIES SERIES
Discharge: HOME | End: 2022-12-27
Payer: COMMERCIAL

## 2022-12-27 PROCEDURE — 97110 THERAPEUTIC EXERCISES: CPT

## 2022-12-27 NOTE — PROGRESS NOTES
Physical Therapy    Physical Therapy Daily Treatment Note    Date:  2022    Patient Name:  Joyce Aden    :  1985  MRN: [id number]  Restrictions/Precautions:     Medical/Treatment Diagnosis Information:   Diagnosis: S82.891 R ankle Fx  Treatment Diagnosis: S82.892 R ankle Fx, fibular avulsion  Insurance/Certification information:  PT Insurance Information: BCBS  Physician Information:   [employer] of care signed (Y/N): Y    Visit# / total visits:    Pain level: 1/10       Time In:  732   Time Out: 427    Progress Note: []  Yes  [x]  No  Next due by: Visit #10 , or 22     Subjective:  Patient arrives to the clinic reporting 1/10 right ankle pain. Noting that her ankle is feeling much better compared to the last session. Objective: TJ complete per flow chart to facilitate strength, motion and stability to allow ease with daily ambulation and work related activities. Re-initiated foam exercises this date due to reports of decreased right ankle pain. Observation:   Test measurements:      Exercises:   Exercise/Equipment Resistance/Repetitions Other comments   Toe/ heel raise 15x Toe raise on blue beam 15x    Squat matrix 10x   9 position   Lunges 10x  Front/ lat / pivot   Step up  10x 6\" Front/ lat    Step   6\" x 10  Back    Step up & over 10x6''    Toe/ heel walk 2x    3 way 15x ea  (no UE)   Resisted walk          BAPS 15x no Ue's  L2 (standing)    Golfers       SLS 30\"x2 FOAM   Tandem stance 30\"x2 FOAM   SLS rebounder  4#   TKE in SLS 15x red    [x] Provided verbal/tactile cueing for activities related to strengthening, flexibility, endurance, ROM. (48746)  [] Provided verbal/tactile cueing for activities related to improving balance, coordination, kinesthetic sense, posture, motor skill, proprioception. (94795)    Therapeutic Activities:     [] Therapeutic activities, direct (one-on-one) patient contact (use of dynamic activities to improve functional performance). (47108)    Gait:   [] Provided training and instruction to the patient for ambulation re-education. (54017)    Self-Care/ADL's  [] Self-care/home management training and compensatory training, meal preparation, safety procedures, and instructions in use of assistive technology devices/adaptive equipment, direct one-on-one contact. (17650)    Home Exercise Program:  Toe raise on 2\" pre-stretch, 4 way ankle TB,   [x] Reviewed/Progressed HEP activities related to strengthening, flexibility, endurance, ROM. (41738)  [] Reviewed/Progressed HEP activities related to improving balance, coordination, kinesthetic sense, posture, motor skill, proprioception.  (44321)    Manual Treatments:    [] Provided manual therapy to mobilize soft tissue/joints for the purpose of modulating pain, promoting relaxation,  increasing ROM, reducing/eliminating soft tissue swelling/inflammation/restriction, improving soft tissue extensibility. (48579)    Service Based Modalities:      Timed Code Treatment Minutes:   29' TJ    Total Treatment Minutes:   29'    Treatment/Activity Tolerance:  [x] Patient tolerated treatment well [] Patient limited by fatique  [] Patient limited by pain  [] Patient limited by other medical complications  [] Other:     Prognosis: [x] Good [] Fair  [] Poor    Patient Requires Follow-up: [x] Yes  [] No      Goals:  Short Term Goals  Time Frame for Short Term Goals: 1 week  Short Term Goal 1: Start HEP (Initiated this date)    Long Term Goals  Time Frame for Long Term Goals : 4 weeks  Long Term Goal 1: R ankle 0-1/10 pain during & after activity  Long Term Goal 2: Descend 1 flight of steps with no pain  Long Term Goal 3: Hop on R foot 10x  Long Term Goal 4: Walk in the grass, stones without sense of instability    Plan:   [x] Continue per plan of care [] Alter current plan (see comments)  [] Plan of care initiated [] Hold pending MD visit [] Discharge    Plan for Next Session:  Monitor tolerance and advance as able. Electronically signed by:  Nasreen Lazaro, PT

## 2022-12-28 ENCOUNTER — HOSPITAL ENCOUNTER (OUTPATIENT)
Dept: PHYSICAL THERAPY | Age: 37
Setting detail: THERAPIES SERIES
Discharge: HOME | End: 2022-12-28
Payer: COMMERCIAL

## 2022-12-28 PROCEDURE — 97110 THERAPEUTIC EXERCISES: CPT | Performed by: PHYSICAL THERAPIST

## 2022-12-28 NOTE — DISCHARGE SUMMARY
[facility]    [x] [place]  Phone: [phone]  Fax: [phone]      [] [place]  Phone: [phone]  Fax: [phone]    Physical Therapy Discharge Note  Date: 2022        Patient Name:  Alexander Frankel    :  1985  MRN: [id number]  Restrictions/Precautions:      Medical/Treatment Diagnosis Information:   Diagnosis: S82.891 R ankle Fx  Treatment Diagnosis:  R ankle Fx, fibular avulsion     Insurance/Certification information:    [facility]  Physician Information:   [address] [place] [address] signed (Y/N): y   Visit# / total visits:  9  Pain level: 0/10       Plan of Care/Treatment to date:  [x] Therapeutic Exercise    [] Modalities:  [x] Therapeutic Activity     [] Ultrasound  [] Electrical Stimulation  [] Gait Training      [] Cervical Traction    [] Lumbar Traction  [] Neuromuscular Re-education  [] Cold/hotpack [] Iontophoresis  [x] Instruction in HEP      Other:  [x] Manual Therapy       []    [] Aquatic Therapy       []                              Subjective:    R ankle feels good         Objective:   Test measurements:   Can hop x10 on R LE   Functioning at a normal level         Plan:    D/c       Goals:   Short Term Goals  Time Frame for Short Term Goals: 1 week  Short Term Goal 1: Start HEP -met     Long Term Goals  Time Frame for Long Term Goals : 4 weeks  Long Term Goal 1: R ankle 0-1/10 pain during & after activity- met  Long Term Goal 2: Descend 1 flight of steps with no pain-met  Long Term Goal 3: Hop on R foot 10x- met  Long Term Goal 4: Walk in the grass, stones without sense of instability- met         Percentage of Goals Met: 100            Discharge Prognosis: [] Excellent [x] Good [] Fair  [] Poor     Goal Status:  [x] Achieved [] Partially Achieved  [] Not Achieved       Electronically signed by:  Casper Luis, PT

## 2022-12-28 NOTE — PROGRESS NOTES
Physical Therapy    Physical Therapy Daily Treatment Note    Date:  2022    Patient Name:  Imelda Marques    :  1985  MRN: [id number]  Restrictions/Precautions:     Medical/Treatment Diagnosis Information:   Diagnosis: S82.891 R ankle Fx  Treatment Diagnosis: S82.892 R ankle Fx, fibular avulsion  Insurance/Certification information:  PT Insurance Information: BCBS  Physician Information:   [employer] of care signed (Y/N): Y    Visit# / total visits:    Pain level: 0/10       Time In: 8:00  Time Out: 8:30    Progress Note: [x]  Yes  []  No  Next due by: Visit #10     Subjective:  R ankle feels good    Objective:   Observation:   Test measurements:   Can hop x10 on R LE   Functioning at a normal level    Exercises:   Exercise/Equipment Resistance/Repetitions Other comments   TM retro, lateral 5', 1.0 MPH    Toe raise on blue beam 20x    Squat matrix 10x  , on airex 9 position   Lunges 10x , on airex Front/ lat / pivot   Step up  10x 8\" Front/ lat / retro   Step back to 1/2 kneel  5x ea Back    Step up & over 10x8''    Toe/ heel walk 2x    3 way     Resisted walk          BAPS Ball 4 30x  A/P/ lat/CW/CCW    Golfers       SLS 30\"x2 FOAM   Tandem stance 30\"x2 FOAM   SLS rebounder   4#   TKE in SLS 15x blue    [x] Provided verbal/tactile cueing for activities related to strengthening, flexibility, endurance, ROM. (57083)  [] Provided verbal/tactile cueing for activities related to improving balance, coordination, kinesthetic sense, posture, motor skill, proprioception. (91809)    Therapeutic Activities:     [] Therapeutic activities, direct (one-on-one) patient contact (use of dynamic activities to improve functional performance). (65235)    Gait:   [] Provided training and instruction to the patient for ambulation re-education.  (76273)    Self-Care/ADL's  [] Self-care/home management training and compensatory training, meal preparation, safety procedures, and instructions in use of assistive technology devices/adaptive equipment, direct one-on-one contact. (51171)    Home Exercise Program:  Toe raise on 2\" pre-stretch, 4 way ankle TB,   [x] Reviewed/Progressed HEP activities related to strengthening, flexibility, endurance, ROM. (73655)  [] Reviewed/Progressed HEP activities related to improving balance, coordination, kinesthetic sense, posture, motor skill, proprioception.  (42167)    Manual Treatments:    [] Provided manual therapy to mobilize soft tissue/joints for the purpose of modulating pain, promoting relaxation,  increasing ROM, reducing/eliminating soft tissue swelling/inflammation/restriction, improving soft tissue extensibility. (55566)    Service Based Modalities:      Timed Code Treatment Minutes:   30' TJ    Total Treatment Minutes:   30'    Treatment/Activity Tolerance:  [x] Patient tolerated treatment well [] Patient limited by fatique  [] Patient limited by pain  [] Patient limited by other medical complications  [] Other:     Prognosis: [x] Good [] Fair  [] Poor    Patient Requires Follow-up: [x] Yes  [] No      Goals:  Short Term Goals  Time Frame for Short Term Goals: 1 week  Short Term Goal 1: Start HEP -met    Long Term Goals  Time Frame for Long Term Goals : 4 weeks  Long Term Goal 1: R ankle 0-1/10 pain during & after activity- met  Long Term Goal 2: Descend 1 flight of steps with no pain-met  Long Term Goal 3: Hop on R foot 10x- met  Long Term Goal 4: Walk in the grass, stones without sense of instability- met    Plan:   [] Continue per plan of care [] Alter current plan (see comments)  [] Plan of care initiated [] Hold pending MD visit [x] Discharge    Plan for Next Session:  Monitor tolerance and advance as able.      Electronically signed by:  Kittie Soulier, PT

## 2024-02-10 ENCOUNTER — OFFICE VISIT (OUTPATIENT)
Dept: PRIMARY CARE CLINIC | Age: 39
End: 2024-02-10
Payer: COMMERCIAL

## 2024-02-10 VITALS
WEIGHT: 124 LBS | SYSTOLIC BLOOD PRESSURE: 112 MMHG | OXYGEN SATURATION: 98 % | HEART RATE: 74 BPM | DIASTOLIC BLOOD PRESSURE: 64 MMHG | TEMPERATURE: 98.3 F | BODY MASS INDEX: 22.68 KG/M2

## 2024-02-10 DIAGNOSIS — J02.9 SORE THROAT: Primary | ICD-10-CM

## 2024-02-10 LAB — S PYO AG THROAT QL: NORMAL

## 2024-02-10 PROCEDURE — 87880 STREP A ASSAY W/OPTIC: CPT | Performed by: FAMILY MEDICINE

## 2024-02-10 PROCEDURE — 99213 OFFICE O/P EST LOW 20 MIN: CPT | Performed by: FAMILY MEDICINE

## 2024-02-10 ASSESSMENT — ENCOUNTER SYMPTOMS
GASTROINTESTINAL NEGATIVE: 1
RESPIRATORY NEGATIVE: 1
ALLERGIC/IMMUNOLOGIC NEGATIVE: 1
SORE THROAT: 1
EYES NEGATIVE: 1

## 2024-02-10 ASSESSMENT — PATIENT HEALTH QUESTIONNAIRE - PHQ9
SUM OF ALL RESPONSES TO PHQ QUESTIONS 1-9: 0
SUM OF ALL RESPONSES TO PHQ9 QUESTIONS 1 & 2: 0
SUM OF ALL RESPONSES TO PHQ QUESTIONS 1-9: 0
2. FEELING DOWN, DEPRESSED OR HOPELESS: 0
1. LITTLE INTEREST OR PLEASURE IN DOING THINGS: 0
SUM OF ALL RESPONSES TO PHQ QUESTIONS 1-9: 0
SUM OF ALL RESPONSES TO PHQ QUESTIONS 1-9: 0

## 2024-02-10 NOTE — PROGRESS NOTES
Subjective:      Patient ID: Nohemi Luevano is a 38 y.o. female.    HPI  acute visit  for sore throat.  Dtr with strep. Last week.  She had a cold 2 weeks ago, still coming off that illness, never had sore throat through illness, mostly viral symptoms, mild residual cough and rhinorrhea.   She is a teacher in [employer].      Past Medical History:   Diagnosis Date    Asthma     Exercise-induced asthma      Past Surgical History:   Procedure Laterality Date     SECTION      MOUTH SURGERY      cyst removal at 8yo    TYMPANOSTOMY TUBE PLACEMENT Right      Current Outpatient Medications   Medication Sig Dispense Refill    cetirizine (ZYRTEC) 10 MG tablet Take 1 tablet by mouth daily      IBUPROFEN PO Take 2 tablets by mouth every 6 hours as needed      mometasone (NASONEX) 50 MCG/ACT nasal spray 2 sprays by Nasal route 2 times daily as needed (allergies)       No current facility-administered medications for this visit.     Allergies   Allergen Reactions    Sulfa Antibiotics Swelling           Review of Systems   Constitutional: Negative.    HENT:  Positive for sore throat.    Eyes: Negative.    Respiratory: Negative.     Cardiovascular: Negative.    Gastrointestinal: Negative.    Endocrine: Negative.    Genitourinary: Negative.    Musculoskeletal: Negative.    Skin: Negative.    Allergic/Immunologic: Negative.    Neurological: Negative.    Hematological: Negative.    Psychiatric/Behavioral: Negative.         Objective:   Physical Exam  Constitutional:       General: She is not in acute distress.     Appearance: She is well-developed.   HENT:      Head: Normocephalic and atraumatic.      Right Ear: External ear normal.      Left Ear: External ear normal.      Mouth/Throat:      Pharynx: No oropharyngeal exudate.   Eyes:      General: No scleral icterus.     Conjunctiva/sclera: Conjunctivae normal.   Neck:      Thyroid: No thyromegaly.   Cardiovascular:      Rate and Rhythm: Normal rate and

## 2025-04-05 ENCOUNTER — OFFICE VISIT (OUTPATIENT)
Dept: PRIMARY CARE CLINIC | Age: 40
End: 2025-04-05
Payer: COMMERCIAL

## 2025-04-05 VITALS
BODY MASS INDEX: 23.19 KG/M2 | SYSTOLIC BLOOD PRESSURE: 100 MMHG | DIASTOLIC BLOOD PRESSURE: 68 MMHG | WEIGHT: 126 LBS | HEIGHT: 62 IN | OXYGEN SATURATION: 100 % | HEART RATE: 72 BPM | RESPIRATION RATE: 16 BRPM | TEMPERATURE: 97.8 F

## 2025-04-05 DIAGNOSIS — J06.9 VIRAL URI: Primary | ICD-10-CM

## 2025-04-05 DIAGNOSIS — H69.91 EUSTACHIAN TUBE DYSFUNCTION, RIGHT: ICD-10-CM

## 2025-04-05 PROCEDURE — 99213 OFFICE O/P EST LOW 20 MIN: CPT | Performed by: FAMILY MEDICINE

## 2025-04-05 RX ORDER — PREDNISONE 20 MG/1
20 TABLET ORAL 2 TIMES DAILY
Qty: 10 TABLET | Refills: 0 | Status: SHIPPED | OUTPATIENT
Start: 2025-04-05 | End: 2025-04-10

## 2025-04-05 SDOH — ECONOMIC STABILITY: FOOD INSECURITY: WITHIN THE PAST 12 MONTHS, YOU WORRIED THAT YOUR FOOD WOULD RUN OUT BEFORE YOU GOT MONEY TO BUY MORE.: NEVER TRUE

## 2025-04-05 SDOH — ECONOMIC STABILITY: FOOD INSECURITY: WITHIN THE PAST 12 MONTHS, THE FOOD YOU BOUGHT JUST DIDN'T LAST AND YOU DIDN'T HAVE MONEY TO GET MORE.: NEVER TRUE

## 2025-04-05 ASSESSMENT — PATIENT HEALTH QUESTIONNAIRE - PHQ9
2. FEELING DOWN, DEPRESSED OR HOPELESS: NOT AT ALL
SUM OF ALL RESPONSES TO PHQ QUESTIONS 1-9: 0
SUM OF ALL RESPONSES TO PHQ QUESTIONS 1-9: 0
1. LITTLE INTEREST OR PLEASURE IN DOING THINGS: NOT AT ALL
SUM OF ALL RESPONSES TO PHQ QUESTIONS 1-9: 0
SUM OF ALL RESPONSES TO PHQ QUESTIONS 1-9: 0

## 2025-04-05 ASSESSMENT — ENCOUNTER SYMPTOMS
SORE THROAT: 0
TROUBLE SWALLOWING: 0
SHORTNESS OF BREATH: 0
CHEST TIGHTNESS: 0
CHOKING: 0
NAUSEA: 0
WHEEZING: 0
ABDOMINAL PAIN: 0
DIARRHEA: 0
COUGH: 1
SINUS PRESSURE: 1
CONSTIPATION: 0

## 2025-04-05 NOTE — PROGRESS NOTES
[facility]  [address]  [place] [address]  Dept: [phone]  Dept Fax: [phone]    Nohemi Luevano  is a 40 y.o. female who presents today for her medical conditions/complaints as noted below.  Nohemi Luevano is c/o of   Chief Complaint   Patient presents with    Ear Pain     Right ear pain and hearing loss starting 3 days ago         HPI:     History of Present Illness  The patient presents today for right ear pain.    Symptoms began on Thursday night following an outdoor event in cold and rainy conditions. A sensation of complete blockage in the right ear is described, accompanied by muffled hearing and dizziness. No drainage from the ear is reported. A history of ear issues, including previous tympanostomy tube placement, is noted, and she believes another ENT consultation for potential tube replacement may be necessary. Hearing is generally impaired, with difficulty understanding regular speech, although high-pitched sounds can be perceived. A history of ear rupture during youth is also mentioned.    Concurrently, a mild cold had been improving until the aforementioned event. Postnasal drip and frequent nose blowing are reported, with the color of nasal discharge changing from clear to yellow on Thursday night, but returning to clear this morning. No work has been missed due to symptoms. Minimal coughing and no gastrointestinal symptoms such as diarrhea or nausea are reported, although slight nausea was felt last night. Headaches, particularly in the area between the eyes, are noted. Symptoms have been managed with nightly Benadryl and occasional ibuprofen. Cold medicine was attempted but discontinued due to associated stomach discomfort.    SOCIAL HISTORY  She teaches art at elementary school.      Past Medical History:   Diagnosis Date    Asthma

## 2025-04-14 ENCOUNTER — TELEPHONE (OUTPATIENT)
Dept: PRIMARY CARE CLINIC | Age: 40
End: 2025-04-14

## 2025-04-14 RX ORDER — AMOXICILLIN 500 MG/1
500 CAPSULE ORAL 2 TIMES DAILY
Qty: 20 CAPSULE | Refills: 0 | Status: SHIPPED | OUTPATIENT
Start: 2025-04-14 | End: 2025-04-24

## 2025-04-14 NOTE — TELEPHONE ENCOUNTER
Seen 4/5 in Walk[facility]In, finished taking her prednisone and all her symptoms returned. This weekend she stated with pain and pressure is her sinus, eyes and ear pain. Can something be sent into [facility] for her to take?

## 2025-04-15 NOTE — TELEPHONE ENCOUNTER
Patient called in stating she went to pharmacy to get her meds and realized it was amoxicillin, per patient she get frequent ear infection and that dose not work well for them. Can she have a zpak sent in?